# Patient Record
Sex: FEMALE | Race: OTHER | Employment: FULL TIME | URBAN - METROPOLITAN AREA
[De-identification: names, ages, dates, MRNs, and addresses within clinical notes are randomized per-mention and may not be internally consistent; named-entity substitution may affect disease eponyms.]

---

## 2018-02-21 ENCOUNTER — OFFICE VISIT (OUTPATIENT)
Dept: INTERNAL MEDICINE CLINIC | Facility: CLINIC | Age: 58
End: 2018-02-21

## 2018-02-21 VITALS
BODY MASS INDEX: 29.73 KG/M2 | HEIGHT: 64 IN | TEMPERATURE: 97 F | WEIGHT: 174.13 LBS | SYSTOLIC BLOOD PRESSURE: 105 MMHG | HEART RATE: 64 BPM | DIASTOLIC BLOOD PRESSURE: 66 MMHG | RESPIRATION RATE: 18 BRPM

## 2018-02-21 DIAGNOSIS — Z12.31 SCREENING MAMMOGRAM, ENCOUNTER FOR: ICD-10-CM

## 2018-02-21 DIAGNOSIS — Z00.00 PE (PHYSICAL EXAM), ROUTINE: Primary | ICD-10-CM

## 2018-02-21 DIAGNOSIS — H91.93 DECREASED HEARING OF BOTH EARS: ICD-10-CM

## 2018-02-21 DIAGNOSIS — Z12.11 SCREEN FOR COLON CANCER: ICD-10-CM

## 2018-02-21 DIAGNOSIS — E04.9 GOITER: ICD-10-CM

## 2018-02-21 PROCEDURE — 99396 PREV VISIT EST AGE 40-64: CPT | Performed by: INTERNAL MEDICINE

## 2018-02-21 PROCEDURE — 99212 OFFICE O/P EST SF 10 MIN: CPT | Performed by: INTERNAL MEDICINE

## 2018-02-21 NOTE — PROGRESS NOTES
HPI:    Patient ID: David Hinojosa is a 66year old female.   Patient patient presents today for physical exam, states doing well otherwise, denies chest pain, shortness of breath, dyspnea on exertion or heart palpitations also denies nausea, vomiting, diarrhe Cardiovascular: Normal rate and normal heart sounds. No murmur heard. Pulmonary/Chest: Effort normal and breath sounds normal. No respiratory distress. She has no wheezes. She has no rales. Abdominal: Soft. She exhibits no mass.  There is no hepatos

## 2018-02-24 ENCOUNTER — LAB ENCOUNTER (OUTPATIENT)
Dept: LAB | Age: 58
End: 2018-02-24
Attending: INTERNAL MEDICINE
Payer: COMMERCIAL

## 2018-02-24 DIAGNOSIS — Z00.00 PE (PHYSICAL EXAM), ROUTINE: ICD-10-CM

## 2018-02-24 LAB
ALBUMIN SERPL BCP-MCNC: 3.9 G/DL (ref 3.5–4.8)
ALBUMIN/GLOB SERPL: 1.1 {RATIO} (ref 1–2)
ALP SERPL-CCNC: 76 U/L (ref 32–100)
ALT SERPL-CCNC: 15 U/L (ref 14–54)
ANION GAP SERPL CALC-SCNC: 8 MMOL/L (ref 0–18)
AST SERPL-CCNC: 17 U/L (ref 15–41)
BACTERIA UR QL AUTO: NEGATIVE /HPF
BASOPHILS # BLD: 0 K/UL (ref 0–0.2)
BASOPHILS NFR BLD: 0 %
BILIRUB SERPL-MCNC: 0.7 MG/DL (ref 0.3–1.2)
BUN SERPL-MCNC: 16 MG/DL (ref 8–20)
BUN/CREAT SERPL: 24.2 (ref 10–20)
CALCIUM SERPL-MCNC: 9.6 MG/DL (ref 8.5–10.5)
CHLORIDE SERPL-SCNC: 105 MMOL/L (ref 95–110)
CHOLEST SERPL-MCNC: 190 MG/DL (ref 110–200)
CO2 SERPL-SCNC: 27 MMOL/L (ref 22–32)
CREAT SERPL-MCNC: 0.66 MG/DL (ref 0.5–1.5)
EOSINOPHIL # BLD: 0.1 K/UL (ref 0–0.7)
EOSINOPHIL NFR BLD: 2 %
ERYTHROCYTE [DISTWIDTH] IN BLOOD BY AUTOMATED COUNT: 13.8 % (ref 11–15)
GLOBULIN PLAS-MCNC: 3.4 G/DL (ref 2.5–3.7)
GLUCOSE SERPL-MCNC: 100 MG/DL (ref 70–99)
HCT VFR BLD AUTO: 40 % (ref 35–48)
HDLC SERPL-MCNC: 46 MG/DL
HGB BLD-MCNC: 13.2 G/DL (ref 12–16)
LDLC SERPL CALC-MCNC: 109 MG/DL (ref 0–99)
LYMPHOCYTES # BLD: 2 K/UL (ref 1–4)
LYMPHOCYTES NFR BLD: 30 %
MCH RBC QN AUTO: 27.6 PG (ref 27–32)
MCHC RBC AUTO-ENTMCNC: 32.9 G/DL (ref 32–37)
MCV RBC AUTO: 83.9 FL (ref 80–100)
MONOCYTES # BLD: 0.4 K/UL (ref 0–1)
MONOCYTES NFR BLD: 6 %
NEUTROPHILS # BLD AUTO: 4.2 K/UL (ref 1.8–7.7)
NEUTROPHILS NFR BLD: 61 %
NONHDLC SERPL-MCNC: 144 MG/DL
OSMOLALITY UR CALC.SUM OF ELEC: 291 MOSM/KG (ref 275–295)
PATIENT FASTING: YES
PLATELET # BLD AUTO: 261 K/UL (ref 140–400)
PMV BLD AUTO: 8.9 FL (ref 7.4–10.3)
POTASSIUM SERPL-SCNC: 4.2 MMOL/L (ref 3.3–5.1)
PROT SERPL-MCNC: 7.3 G/DL (ref 5.9–8.4)
RBC # BLD AUTO: 4.77 M/UL (ref 3.7–5.4)
RBC #/AREA URNS AUTO: 2 /HPF
SODIUM SERPL-SCNC: 140 MMOL/L (ref 136–144)
TRIGL SERPL-MCNC: 175 MG/DL (ref 1–149)
TSH SERPL-ACNC: 3.91 UIU/ML (ref 0.45–5.33)
WBC # BLD AUTO: 6.8 K/UL (ref 4–11)
WBC #/AREA URNS AUTO: 1 /HPF

## 2018-02-24 PROCEDURE — 80053 COMPREHEN METABOLIC PANEL: CPT

## 2018-02-24 PROCEDURE — 84443 ASSAY THYROID STIM HORMONE: CPT

## 2018-02-24 PROCEDURE — 36415 COLL VENOUS BLD VENIPUNCTURE: CPT

## 2018-02-24 PROCEDURE — 81015 MICROSCOPIC EXAM OF URINE: CPT

## 2018-02-24 PROCEDURE — 85025 COMPLETE CBC W/AUTO DIFF WBC: CPT

## 2018-02-24 PROCEDURE — 80061 LIPID PANEL: CPT

## 2018-03-04 NOTE — PROGRESS NOTES
Please call patient with blood test results. Kidney and liver function are normal, no anemia. Cholesterol is mildly elevated   sugar is borderline   Thyroid hormone is normal as well. Urine is clear.      Keep with  Low fat and  Sugar diet   , walk ,

## 2018-03-05 ENCOUNTER — HOSPITAL ENCOUNTER (OUTPATIENT)
Dept: MAMMOGRAPHY | Age: 58
Discharge: HOME | End: 2018-03-05
Attending: INTERNAL MEDICINE
Payer: COMMERCIAL

## 2018-03-05 DIAGNOSIS — Z12.31 SCREENING MAMMOGRAM, ENCOUNTER FOR: ICD-10-CM

## 2018-03-05 PROCEDURE — 77067 SCR MAMMO BI INCL CAD: CPT | Performed by: INTERNAL MEDICINE

## 2018-03-06 ENCOUNTER — OFFICE VISIT (OUTPATIENT)
Dept: AUDIOLOGY | Facility: CLINIC | Age: 58
End: 2018-03-06

## 2018-03-06 ENCOUNTER — OFFICE VISIT (OUTPATIENT)
Dept: OTOLARYNGOLOGY | Facility: CLINIC | Age: 58
End: 2018-03-06

## 2018-03-06 VITALS
DIASTOLIC BLOOD PRESSURE: 86 MMHG | BODY MASS INDEX: 29.71 KG/M2 | HEIGHT: 64 IN | WEIGHT: 174 LBS | TEMPERATURE: 99 F | SYSTOLIC BLOOD PRESSURE: 137 MMHG

## 2018-03-06 DIAGNOSIS — E01.0 THYROMEGALY: ICD-10-CM

## 2018-03-06 DIAGNOSIS — H91.93 BILATERAL HEARING LOSS, UNSPECIFIED HEARING LOSS TYPE: Primary | ICD-10-CM

## 2018-03-06 DIAGNOSIS — H90.3 SENSORINEURAL HEARING LOSS, BILATERAL: Primary | ICD-10-CM

## 2018-03-06 PROCEDURE — 99212 OFFICE O/P EST SF 10 MIN: CPT | Performed by: OTOLARYNGOLOGY

## 2018-03-06 PROCEDURE — 99243 OFF/OP CNSLTJ NEW/EST LOW 30: CPT | Performed by: OTOLARYNGOLOGY

## 2018-03-06 PROCEDURE — 92567 TYMPANOMETRY: CPT | Performed by: AUDIOLOGIST

## 2018-03-06 PROCEDURE — 92557 COMPREHENSIVE HEARING TEST: CPT | Performed by: AUDIOLOGIST

## 2018-03-06 RX ORDER — MELOXICAM 15 MG/1
15 TABLET ORAL DAILY
Qty: 30 TABLET | Refills: 3 | Status: SHIPPED | OUTPATIENT
Start: 2018-03-06 | End: 2018-05-02

## 2018-03-06 NOTE — PROGRESS NOTES
AUDIOGRAM     Fiordaliza Leblanc was referred for testing by Yomaira Berry for decreased hearing in both ears   1/31/1960  [id number]        Otoscopic inspection: right ear no cerumen; left ear no cerumen.        Tests/Procedures  Patient was tested via  stand

## 2018-03-06 NOTE — PROGRESS NOTES
Sharon Anne is a 66year old female. Patient presents with:  Hearing Loss: bilateral       HISTORY OF PRESENT ILLNESS  She presents with a history of bilateral hearing loss. She states that she has difficulty hearing her  and her children.   She al Negative Abdominal pain and diarrhea. Endocrine Negative Cold intolerance and heat intolerance. Neuro Negative Tremors. Psych Negative Anxiety and depression. Integumentary Negative Frequent skin infections, pigment change and rash.    Hema/Lymph Ne thyroid. I have recommended an ultrasound to evaluate the size and for nodularity. She appears to have TMJ related pain and discomfort to the right ear. Start Mobic warm heat soft diet and return to see me after her ultrasound.   Audiogram performed tomelissa

## 2018-03-20 ENCOUNTER — HOSPITAL ENCOUNTER (EMERGENCY)
Facility: HOSPITAL | Age: 58
Discharge: HOME | End: 2018-03-20
Attending: EMERGENCY MEDICINE
Payer: COMMERCIAL

## 2018-03-20 ENCOUNTER — APPOINTMENT (OUTPATIENT)
Dept: CT IMAGING | Facility: HOSPITAL | Age: 58
End: 2018-03-20
Attending: EMERGENCY MEDICINE
Payer: COMMERCIAL

## 2018-03-20 VITALS
TEMPERATURE: 98 F | OXYGEN SATURATION: 99 % | BODY MASS INDEX: 29.71 KG/M2 | WEIGHT: 174 LBS | RESPIRATION RATE: 18 BRPM | HEART RATE: 60 BPM | DIASTOLIC BLOOD PRESSURE: 88 MMHG | SYSTOLIC BLOOD PRESSURE: 128 MMHG | HEIGHT: 64 IN

## 2018-03-20 DIAGNOSIS — K64.9 HEMORRHOIDS, UNSPECIFIED HEMORRHOID TYPE: ICD-10-CM

## 2018-03-20 DIAGNOSIS — R10.9 ABDOMINAL PAIN OF UNKNOWN ETIOLOGY: Primary | ICD-10-CM

## 2018-03-20 DIAGNOSIS — K62.5 RECTAL BLEEDING: ICD-10-CM

## 2018-03-20 LAB
ALBUMIN SERPL BCP-MCNC: 4.1 G/DL (ref 3.5–4.8)
ALP SERPL-CCNC: 78 U/L (ref 32–100)
ALT SERPL-CCNC: 21 U/L (ref 14–54)
ANION GAP SERPL CALC-SCNC: 4 MMOL/L (ref 0–18)
AST SERPL-CCNC: 26 U/L (ref 15–41)
BASOPHILS # BLD: 0 K/UL (ref 0–0.2)
BASOPHILS NFR BLD: 1 %
BILIRUB DIRECT SERPL-MCNC: 0.2 MG/DL (ref 0–0.2)
BILIRUB SERPL-MCNC: 0.5 MG/DL (ref 0.3–1.2)
BILIRUB UR QL: NEGATIVE
BUN SERPL-MCNC: 14 MG/DL (ref 8–20)
BUN/CREAT SERPL: 18.7 (ref 10–20)
CALCIUM SERPL-MCNC: 8.8 MG/DL (ref 8.5–10.5)
CHLORIDE SERPL-SCNC: 104 MMOL/L (ref 95–110)
CLARITY UR: CLEAR
CO2 SERPL-SCNC: 27 MMOL/L (ref 22–32)
COLOR UR: YELLOW
CREAT SERPL-MCNC: 0.75 MG/DL (ref 0.5–1.5)
EOSINOPHIL # BLD: 0.2 K/UL (ref 0–0.7)
EOSINOPHIL NFR BLD: 2 %
ERYTHROCYTE [DISTWIDTH] IN BLOOD BY AUTOMATED COUNT: 14.1 % (ref 11–15)
GLUCOSE SERPL-MCNC: 105 MG/DL (ref 70–99)
GLUCOSE UR-MCNC: NEGATIVE MG/DL
HCT VFR BLD AUTO: 39 % (ref 35–48)
HGB BLD-MCNC: 13 G/DL (ref 12–16)
KETONES UR-MCNC: NEGATIVE MG/DL
LEUKOCYTE ESTERASE UR QL STRIP.AUTO: NEGATIVE
LIPASE SERPL-CCNC: 23 U/L (ref 22–51)
LYMPHOCYTES # BLD: 3 K/UL (ref 1–4)
LYMPHOCYTES NFR BLD: 37 %
MCH RBC QN AUTO: 28.3 PG (ref 27–32)
MCHC RBC AUTO-ENTMCNC: 33.4 G/DL (ref 32–37)
MCV RBC AUTO: 84.7 FL (ref 80–100)
MONOCYTES # BLD: 0.5 K/UL (ref 0–1)
MONOCYTES NFR BLD: 6 %
NEUTROPHILS # BLD AUTO: 4.4 K/UL (ref 1.8–7.7)
NEUTROPHILS NFR BLD: 55 %
NITRITE UR QL STRIP.AUTO: NEGATIVE
OSMOLALITY UR CALC.SUM OF ELEC: 281 MOSM/KG (ref 275–295)
PH UR: 5 [PH] (ref 5–8)
PLATELET # BLD AUTO: 295 K/UL (ref 140–400)
PMV BLD AUTO: 9.4 FL (ref 7.4–10.3)
POTASSIUM SERPL-SCNC: 3.8 MMOL/L (ref 3.3–5.1)
PROT SERPL-MCNC: 7.6 G/DL (ref 5.9–8.4)
PROT UR-MCNC: NEGATIVE MG/DL
RBC # BLD AUTO: 4.61 M/UL (ref 3.7–5.4)
RBC #/AREA URNS AUTO: 1 /HPF
SODIUM SERPL-SCNC: 135 MMOL/L (ref 136–144)
SP GR UR STRIP: 1.02 (ref 1–1.03)
UROBILINOGEN UR STRIP-ACNC: <2
VIT C UR-MCNC: NEGATIVE MG/DL
WBC # BLD AUTO: 8.2 K/UL (ref 4–11)
WBC #/AREA URNS AUTO: 2 /HPF

## 2018-03-20 PROCEDURE — 83690 ASSAY OF LIPASE: CPT | Performed by: EMERGENCY MEDICINE

## 2018-03-20 PROCEDURE — 80048 BASIC METABOLIC PNL TOTAL CA: CPT | Performed by: EMERGENCY MEDICINE

## 2018-03-20 PROCEDURE — 93010 ELECTROCARDIOGRAM REPORT: CPT | Performed by: EMERGENCY MEDICINE

## 2018-03-20 PROCEDURE — 93005 ELECTROCARDIOGRAM TRACING: CPT

## 2018-03-20 PROCEDURE — 74177 CT ABD & PELVIS W/CONTRAST: CPT | Performed by: EMERGENCY MEDICINE

## 2018-03-20 PROCEDURE — 96374 THER/PROPH/DIAG INJ IV PUSH: CPT

## 2018-03-20 PROCEDURE — 80076 HEPATIC FUNCTION PANEL: CPT | Performed by: EMERGENCY MEDICINE

## 2018-03-20 PROCEDURE — 99285 EMERGENCY DEPT VISIT HI MDM: CPT

## 2018-03-20 PROCEDURE — 81001 URINALYSIS AUTO W/SCOPE: CPT | Performed by: EMERGENCY MEDICINE

## 2018-03-20 PROCEDURE — 85025 COMPLETE CBC W/AUTO DIFF WBC: CPT | Performed by: EMERGENCY MEDICINE

## 2018-03-20 RX ORDER — ONDANSETRON 2 MG/ML
4 INJECTION INTRAMUSCULAR; INTRAVENOUS ONCE
Status: COMPLETED | OUTPATIENT
Start: 2018-03-20 | End: 2018-03-20

## 2018-03-20 NOTE — ED PROVIDER NOTES
Patient Seen in: [facility] AND [facility] Emergency Department    History   Patient presents with:  Abdomen/Flank Pain (GI/)  GI Bleeding (gastrointestinal)    Stated Complaint: worsening abdominal x3 days; noticed blood in stool    HPI    65 yo female with s distal pulses. Pulmonary/Chest: Effort normal and breath sounds normal.   Abdominal: Soft. Bowel sounds are normal. She exhibits no distension and no mass. There is tenderness (epigastric and left lower quadrant). There is no rebound and no guarding. 0407  ------------------------------------------------------------       [facility]     Labs reviewed and are essentially unremarkable. CT read by vision. No diverticulitis or colitis. Normal appendix.                Disposition and Plan     Clinical Impression:

## 2018-03-21 NOTE — H&P
[address] [facility] [address] Gastroenterology                                                                                                  Clinic History and Physical     Pa sleep apnea. Pertinent Family Hx:  - No family hx of esophageal, gastric or colon cancer  - No family history of IBD.     Prior endoscopies:  Denies    Social Hx:  - No smoking/etoh  - Denies illicit drug use   - LMP: Hysterectomy  - Lives with spouse  - fever  ENDOCRINE:  negative for cold intolerance and heat intolerance  MUSCULOSKELETAL:  negative for joint effusion/severe erythema  BEHAVIOR/PSYCH:  negative for psychotic behavior      PHYSICAL EXAM:   Blood pressure 136/88, pulse 66, height 5' 4.5\" (1 female provider. 2.  Epigastric pain/heartburn: Epigastric tenderness was noted on physical exam.  No past history of H. pylori or GERD. No previous OTC or prescription measures. Symptoms likely due to acid reflux.   Would recommend H. pylori testing t

## 2018-03-22 ENCOUNTER — OFFICE VISIT (OUTPATIENT)
Dept: GASTROENTEROLOGY | Facility: CLINIC | Age: 58
End: 2018-03-22

## 2018-03-22 ENCOUNTER — TELEPHONE (OUTPATIENT)
Dept: GASTROENTEROLOGY | Facility: CLINIC | Age: 58
End: 2018-03-22

## 2018-03-22 VITALS
DIASTOLIC BLOOD PRESSURE: 88 MMHG | SYSTOLIC BLOOD PRESSURE: 136 MMHG | HEIGHT: 64.5 IN | BODY MASS INDEX: 29.35 KG/M2 | WEIGHT: 174 LBS | HEART RATE: 66 BPM

## 2018-03-22 DIAGNOSIS — R12 HEARTBURN: ICD-10-CM

## 2018-03-22 DIAGNOSIS — K92.1 BLOOD IN STOOL: Primary | ICD-10-CM

## 2018-03-22 DIAGNOSIS — R10.13 EPIGASTRIC PAIN: ICD-10-CM

## 2018-03-22 PROCEDURE — 99203 OFFICE O/P NEW LOW 30 MIN: CPT | Performed by: NURSE PRACTITIONER

## 2018-03-22 PROCEDURE — 99212 OFFICE O/P EST SF 10 MIN: CPT | Performed by: NURSE PRACTITIONER

## 2018-03-22 RX ORDER — PANTOPRAZOLE SODIUM 20 MG/1
20 TABLET, DELAYED RELEASE ORAL
Qty: 30 TABLET | Refills: 5 | Status: SHIPPED | OUTPATIENT
Start: 2018-03-22 | End: 2018-04-21

## 2018-03-22 RX ORDER — ACETAMINOPHEN 325 MG/1
325 TABLET ORAL EVERY 6 HOURS PRN
COMMUNITY

## 2018-03-22 NOTE — TELEPHONE ENCOUNTER
Scheduled for:  Colonoscopy 16518  Provider Name: Dr. Natalie Casanova  Date:  4/13/18  Location:  [facility]  Sedation:  IV  Time:  1100 (pt is aware to arrive at 1000)   Prep:  Colyte  Meds/Allergies Reconciled?:  Reyna/ELMER reviewed   Diagnosis with codes:  Blood in stool

## 2018-03-22 NOTE — PATIENT INSTRUCTIONS
1. Schedule colonoscopy with Dr. Peters Setting w/ IV Twilight    2.  bowel prep from pharmacy - split dose Colyte    3. Continue all medications for procedure     4. Read all bowel prep instructions carefully    5.  AVOID seeds, nuts, popcorn, raw fruits and

## 2018-03-23 NOTE — H&P
Gastroenterology attending:    I have reviewed the medical record and the thorough consultation of ELMER Randall.   This is a 57-year-old female who is due for colorectal screening but also has blood in stool and no prior history of endoscopic evaluat

## 2018-03-24 ENCOUNTER — HOSPITAL ENCOUNTER (OUTPATIENT)
Dept: ULTRASOUND IMAGING | Age: 58
Discharge: HOME | End: 2018-03-24
Attending: OTOLARYNGOLOGY
Payer: COMMERCIAL

## 2018-03-24 DIAGNOSIS — E01.0 THYROMEGALY: ICD-10-CM

## 2018-03-24 PROCEDURE — 76536 US EXAM OF HEAD AND NECK: CPT | Performed by: OTOLARYNGOLOGY

## 2018-03-26 ENCOUNTER — APPOINTMENT (OUTPATIENT)
Dept: LAB | Age: 58
End: 2018-03-26
Attending: NURSE PRACTITIONER
Payer: COMMERCIAL

## 2018-03-26 DIAGNOSIS — R10.13 EPIGASTRIC PAIN: ICD-10-CM

## 2018-03-26 PROCEDURE — 87338 HPYLORI STOOL AG IA: CPT

## 2018-03-28 LAB — HELICOBACTER PYLORI AG, FECAL: POSITIVE

## 2018-03-30 ENCOUNTER — TELEPHONE (OUTPATIENT)
Dept: GASTROENTEROLOGY | Facility: CLINIC | Age: 58
End: 2018-03-30

## 2018-03-30 NOTE — TELEPHONE ENCOUNTER
----- Message from ELMER Caldwell sent at 3/28/2018  4:35 PM CDT -----  Nursing: Please let the patient know her H. pylori test came back positive. This infection in the stomach that requires antibiotic treatment.   I have sent to antibiotics and 1 ac

## 2018-03-30 NOTE — TELEPHONE ENCOUNTER
Spoke to pt, relayed Yael PAYNE's message as shown below. Pt educated on the route, dose, and frequency of medications. Pt verbalized understanding of whole message and had no further questions at this time.

## 2018-04-10 ENCOUNTER — TELEPHONE (OUTPATIENT)
Dept: GASTROENTEROLOGY | Facility: CLINIC | Age: 58
End: 2018-04-10

## 2018-04-10 DIAGNOSIS — K92.1 BLOOD IN STOOL: Primary | ICD-10-CM

## 2018-04-10 NOTE — TELEPHONE ENCOUNTER
Rescheduled for: Colonoscopy 90101    Provider Name: Dr. Brionna Camacho  Date:    From-4/13/18  To-6/29/18  Location:  [facility]  Sedation:  IV  Time:    From-1100  To-1300 (pt is aware to arrive at 1200)   Prep:  Enrike, mailed new instructions on 4/10/18  Meds/Allergies

## 2018-05-02 ENCOUNTER — OFFICE VISIT (OUTPATIENT)
Dept: INTERNAL MEDICINE CLINIC | Facility: CLINIC | Age: 58
End: 2018-05-02

## 2018-05-02 ENCOUNTER — NURSE TRIAGE (OUTPATIENT)
Dept: OTHER | Age: 58
End: 2018-05-02

## 2018-05-02 VITALS
OXYGEN SATURATION: 98 % | BODY MASS INDEX: 30.02 KG/M2 | TEMPERATURE: 100 F | HEIGHT: 64.5 IN | DIASTOLIC BLOOD PRESSURE: 82 MMHG | WEIGHT: 178 LBS | HEART RATE: 92 BPM | SYSTOLIC BLOOD PRESSURE: 130 MMHG

## 2018-05-02 DIAGNOSIS — J20.2 ACUTE BRONCHITIS DUE TO STREPTOCOCCUS: Primary | ICD-10-CM

## 2018-05-02 PROCEDURE — 99213 OFFICE O/P EST LOW 20 MIN: CPT | Performed by: INTERNAL MEDICINE

## 2018-05-02 PROCEDURE — 99212 OFFICE O/P EST SF 10 MIN: CPT | Performed by: INTERNAL MEDICINE

## 2018-05-02 RX ORDER — ALBUTEROL SULFATE 90 UG/1
2 AEROSOL, METERED RESPIRATORY (INHALATION) EVERY 6 HOURS PRN
Qty: 1 INHALER | Refills: 6 | Status: SHIPPED | OUTPATIENT
Start: 2018-05-02 | End: 2018-05-09

## 2018-05-02 RX ORDER — AZITHROMYCIN 250 MG/1
250 TABLET, FILM COATED ORAL DAILY
Qty: 6 TABLET | Refills: 0 | Status: SHIPPED | OUTPATIENT
Start: 2018-05-02 | End: 2018-05-09

## 2018-05-02 RX ORDER — BENZONATATE 100 MG/1
100 CAPSULE ORAL 2 TIMES DAILY PRN
Qty: 10 CAPSULE | Refills: 0 | Status: SHIPPED | OUTPATIENT
Start: 2018-05-02 | End: 2018-05-09

## 2018-05-02 NOTE — PROGRESS NOTES
Tegan Hutton is a 66year old female.   Patient presents with:  URI: cough, headache, aches and chest tightness started 3 days ago      HPI:   Pt is a 65 yo female comes as an urgent visit   c/c sob x 3 days   c/o HA and coughing   Ros as below   Headache joint pain, muscle pain  NEURO: + headaches  ,no  Anxiety or  depression    EXAM:   /82 (BP Location: Left arm, Patient Position: Sitting, Cuff Size: adult)   Pulse 92   Temp 99.7 °F (37.6 °C) (Oral)   Ht 5' 4.5\" (1.638 m)   Wt 178 lb (80.7 kg)   Sp

## 2018-05-02 NOTE — TELEPHONE ENCOUNTER
Action Requested: Summary for Provider     []  Critical Lab, Recommendations Needed  [] Need Additional Advice  []   FYI    []   Need Orders  [] Need Medications Sent to Pharmacy  []  Other     SUMMARY: appt scheduled today with Dr. Fawad Juan for cough, feve

## 2018-05-09 ENCOUNTER — OFFICE VISIT (OUTPATIENT)
Dept: INTERNAL MEDICINE CLINIC | Facility: CLINIC | Age: 58
End: 2018-05-09

## 2018-05-09 VITALS
DIASTOLIC BLOOD PRESSURE: 78 MMHG | WEIGHT: 178 LBS | HEART RATE: 62 BPM | OXYGEN SATURATION: 98 % | BODY MASS INDEX: 30.02 KG/M2 | TEMPERATURE: 98 F | SYSTOLIC BLOOD PRESSURE: 144 MMHG | HEIGHT: 64.5 IN

## 2018-05-09 DIAGNOSIS — J45.21 MILD INTERMITTENT ASTHMA WITH EXACERBATION: Primary | ICD-10-CM

## 2018-05-09 PROCEDURE — 99212 OFFICE O/P EST SF 10 MIN: CPT | Performed by: INTERNAL MEDICINE

## 2018-05-09 PROCEDURE — 99214 OFFICE O/P EST MOD 30 MIN: CPT | Performed by: INTERNAL MEDICINE

## 2018-05-09 RX ORDER — PREDNISONE 10 MG/1
TABLET ORAL
Qty: 20 TABLET | Refills: 0 | Status: SHIPPED | OUTPATIENT
Start: 2018-05-09 | End: 2018-10-03 | Stop reason: ALTCHOICE

## 2018-05-09 RX ORDER — GUAIFENESIN AND CODEINE PHOSPHATE 100; 10 MG/5ML; MG/5ML
5 SOLUTION ORAL 2 TIMES DAILY
Qty: 118 ML | Refills: 0 | Status: SHIPPED | OUTPATIENT
Start: 2018-05-09 | End: 2018-05-23

## 2018-05-09 NOTE — PROGRESS NOTES
Norberto Schwartz is a 66year old female.   Patient presents with:  Bronchitis: follow up      HPI:   She comes for follow-up for her bronchitis  c/c cough and fevers  c/o she is a little bit better but still with congestion in the chest . states that her shor wheezing  MUS: No back pain, joint pain, muscle pain  NEURO: + headaches  ,no  Anxiety or  depression    EXAM:   /78 (BP Location: Right arm, Patient Position: Sitting, Cuff Size: adult)   Pulse 62   Temp 98 °F (36.7 °C) (Oral)   Ht 5' 4.5\" (1.638 m

## 2018-05-09 NOTE — PATIENT INSTRUCTIONS
Asthma Trigger Checklist  Allergens, irritants, and other things may trigger your asthma. Check the box next to each of your triggers. After each trigger is a list of ways to avoid it.     Dust mites.  Dust mites live in mattresses, bedding, carpets, emanuel · Remove garbage from your home daily. · Store food in tightly sealed containers. Wash dishes as soon as they are used. · Use bait stations or traps to control roaches. Avoid using chemical sprays.      Smoke.  Smoke may be from cigarettes, cigars, pipes, · Watch for very high or low temperatures, very humid conditions, or a lot of wind, as these conditions can make symptoms worse. · Limit outdoor activity during the type of weather that affects you.   · Wear a scarf over your mouth and nose in cold weather · Stop if you have any symptoms. Make sure you talk with your provider about these symptoms. Date Last Reviewed: 1/1/2017  © 4885-3163 [employer]. [address] [facility], [address] [place] [address]. All rights reserved.  This information is not inten

## 2018-05-31 ENCOUNTER — OFFICE VISIT (OUTPATIENT)
Dept: AUDIOLOGY | Facility: CLINIC | Age: 58
End: 2018-05-31

## 2018-05-31 ENCOUNTER — OFFICE VISIT (OUTPATIENT)
Dept: OTOLARYNGOLOGY | Facility: CLINIC | Age: 58
End: 2018-05-31

## 2018-05-31 VITALS
DIASTOLIC BLOOD PRESSURE: 81 MMHG | BODY MASS INDEX: 30.05 KG/M2 | SYSTOLIC BLOOD PRESSURE: 123 MMHG | TEMPERATURE: 98 F | HEIGHT: 64 IN | WEIGHT: 176 LBS

## 2018-05-31 DIAGNOSIS — R42 DIZZINESS: Primary | ICD-10-CM

## 2018-05-31 DIAGNOSIS — R43.9 SMELL AND TASTE DISORDER: ICD-10-CM

## 2018-05-31 PROCEDURE — 92567 TYMPANOMETRY: CPT | Performed by: AUDIOLOGIST

## 2018-05-31 PROCEDURE — 99212 OFFICE O/P EST SF 10 MIN: CPT | Performed by: OTOLARYNGOLOGY

## 2018-05-31 PROCEDURE — 99214 OFFICE O/P EST MOD 30 MIN: CPT | Performed by: OTOLARYNGOLOGY

## 2018-05-31 PROCEDURE — 92557 COMPREHENSIVE HEARING TEST: CPT | Performed by: AUDIOLOGIST

## 2018-05-31 RX ORDER — VALACYCLOVIR HYDROCHLORIDE 500 MG/1
500 TABLET, FILM COATED ORAL EVERY 8 HOURS
Qty: 21 TABLET | Refills: 0 | Status: SHIPPED | OUTPATIENT
Start: 2018-05-31 | End: 2018-06-07

## 2018-05-31 RX ORDER — PREDNISONE 10 MG/1
TABLET ORAL
Qty: 44 TABLET | Refills: 0 | Status: SHIPPED | OUTPATIENT
Start: 2018-05-31 | End: 2018-10-03 | Stop reason: ALTCHOICE

## 2018-05-31 NOTE — PROGRESS NOTES
Chad Jesus is a 66year old female.   Patient presents with:  Ear Problem: pt reports wakes up dizzy. ears feel full, some right ear pain  Mouth/Lip Problem: pt reports no sense of smell and taste for 3 wks      HISTORY OF PRESENT ILLNESS  She presents w and syncope. GI Negative Abdominal pain and diarrhea. Endocrine Negative Cold intolerance and heat intolerance. Neuro Negative Tremors. Psych Negative Anxiety and depression.    Integumentary Negative Frequent skin infections, pigment change and michelle daily day one through 5, 4 tablets daily on days  6 and 7, 2 tablets daily on days 8 and 9, One tablet daily on days 10 and 11., Disp: 44 tablet, Rfl: 0  •  acetaminophen 325 MG Oral Tab, Take 325 mg by mouth every 6 (six) hours as needed for Pain., Disp:

## 2018-06-04 NOTE — PROGRESS NOTES
AUDIOGRAM     Fiordaliza Leblanc was referred for testing by Sam Streeter for dizziness  1/31/1960  [id number]        Otoscopic inspection: right ear no cerumen; left ear no cerumen.        Tests/Procedures  Patient was tested via  standard insert earphones a

## 2018-06-27 ENCOUNTER — TELEPHONE (OUTPATIENT)
Dept: GASTROENTEROLOGY | Facility: CLINIC | Age: 58
End: 2018-06-27

## 2018-06-27 DIAGNOSIS — K92.1 BLOOD IN STOOL: Primary | ICD-10-CM

## 2018-06-28 NOTE — TELEPHONE ENCOUNTER
Called pt left message notifying pt we received her two phone calls to cancel her CLN for 6/29/18 w/ Dr Fanny Rosado at 1:00 PM at [address] [place] [address] with IV sedation. Cancelled procedure in EPIC,sent surgical case change request, forwarded to GI schedulers.     Dr. Fanny Rosado- [facility]

## 2018-07-17 ENCOUNTER — TELEPHONE (OUTPATIENT)
Dept: GASTROENTEROLOGY | Facility: CLINIC | Age: 58
End: 2018-07-17

## 2018-07-17 NOTE — TELEPHONE ENCOUNTER
Dr. Juan David CEJA    After speaking with Sandra Heath, it looks like the date for this pt's procedure was entered in wrong. This pt is NOT scheduled for 7/18/18 @ [facility]. The correct scheduling date for this pt is 8/10/18 @ [facility].  Sandra Heath will reach out to Halima to c

## 2018-07-17 NOTE — TELEPHONE ENCOUNTER
Future Appointments  Date Time Provider Danay Molina   8/10/2018 1:30 PM SACK, PROCEDURE [id number] None     No further action required by GI Clinical Staff

## 2018-07-17 NOTE — TELEPHONE ENCOUNTER
The GI  endoscopy staff noted that this patient is on the schedule for tomorrow, July 18 for colonoscopy at the hospital, however I will be in the office at  [facility] office building.      In addition, it appears that she will be on for colonoscopy in

## 2018-08-07 ENCOUNTER — TELEPHONE (OUTPATIENT)
Dept: GASTROENTEROLOGY | Facility: CLINIC | Age: 58
End: 2018-08-07

## 2018-08-07 NOTE — TELEPHONE ENCOUNTER
Tried calling the Pt and there is no answer not able to leave Messages and I  Mailed letter to patient notifying her  of overdue labs (H-Pylori Stool Ag,Eia ) Ordered 3/28/18  and due 7/28/18. Overdue letter mailed to patient.

## 2018-08-10 ENCOUNTER — SURGERY (OUTPATIENT)
Age: 58
End: 2018-08-10

## 2018-08-10 ENCOUNTER — HOSPITAL ENCOUNTER (OUTPATIENT)
Facility: HOSPITAL | Age: 58
Setting detail: HOSPITAL OUTPATIENT SURGERY
Discharge: HOME | End: 2018-08-10
Attending: INTERNAL MEDICINE | Admitting: INTERNAL MEDICINE
Payer: COMMERCIAL

## 2018-08-10 VITALS
SYSTOLIC BLOOD PRESSURE: 104 MMHG | RESPIRATION RATE: 17 BRPM | HEART RATE: 58 BPM | BODY MASS INDEX: 34.27 KG/M2 | DIASTOLIC BLOOD PRESSURE: 85 MMHG | OXYGEN SATURATION: 97 % | WEIGHT: 170 LBS | HEIGHT: 59.06 IN

## 2018-08-10 DIAGNOSIS — K92.1 BLOOD IN STOOL: ICD-10-CM

## 2018-08-10 PROBLEM — K64.8 INTERNAL HEMORRHOIDS WITHOUT COMPLICATION: Status: ACTIVE | Noted: 2018-08-10

## 2018-08-10 PROCEDURE — 45378 DIAGNOSTIC COLONOSCOPY: CPT | Performed by: INTERNAL MEDICINE

## 2018-08-10 PROCEDURE — G0500 MOD SEDAT ENDO SERVICE >5YRS: HCPCS | Performed by: INTERNAL MEDICINE

## 2018-08-10 PROCEDURE — 0DJD8ZZ INSPECTION OF LOWER INTESTINAL TRACT, VIA NATURAL OR ARTIFICIAL OPENING ENDOSCOPIC: ICD-10-PCS | Performed by: INTERNAL MEDICINE

## 2018-08-10 RX ORDER — SODIUM CHLORIDE 0.9 % (FLUSH) 0.9 %
10 SYRINGE (ML) INJECTION AS NEEDED
Status: DISCONTINUED | OUTPATIENT
Start: 2018-08-10 | End: 2018-08-10

## 2018-08-10 RX ORDER — ONDANSETRON 2 MG/ML
4 INJECTION INTRAMUSCULAR; INTRAVENOUS ONCE
Status: COMPLETED | OUTPATIENT
Start: 2018-08-10 | End: 2018-08-10

## 2018-08-10 RX ORDER — SODIUM CHLORIDE, SODIUM LACTATE, POTASSIUM CHLORIDE, CALCIUM CHLORIDE 600; 310; 30; 20 MG/100ML; MG/100ML; MG/100ML; MG/100ML
INJECTION, SOLUTION INTRAVENOUS CONTINUOUS
Status: DISCONTINUED | OUTPATIENT
Start: 2018-08-10 | End: 2018-08-10

## 2018-08-10 RX ORDER — MIDAZOLAM HYDROCHLORIDE 1 MG/ML
INJECTION INTRAMUSCULAR; INTRAVENOUS
Status: DISCONTINUED | OUTPATIENT
Start: 2018-08-10 | End: 2018-08-10

## 2018-08-10 RX ORDER — MIDAZOLAM HYDROCHLORIDE 1 MG/ML
1 INJECTION INTRAMUSCULAR; INTRAVENOUS EVERY 5 MIN PRN
Status: DISCONTINUED | OUTPATIENT
Start: 2018-08-10 | End: 2018-08-10

## 2018-08-10 NOTE — BRIEF OP NOTE
Pre-Operative Diagnosis: Colorectal screening, blood in stool      Post-Operative Diagnosis: Normal colonoscopy to the terminal ileum, small internal hemorrhoids     Procedure Performed:   Procedure(s):  COLONOSCOPY    Surgeon(s) and Role:     * Gilmer Boateng

## 2018-08-10 NOTE — OPERATIVE REPORT
[facility]    PATIENT'S NAME: RILEY OZUNA   ATTENDING PHYSICIAN: Grace Wen MD   OPERATING PHYSICIAN: Grace Wen MD   PATIENT ACCOUNT#:   [id number]    LOCATION:  [facility] ROOM 2 [address] tolerated the procedure well without immediate complication. IMPRESSION:  Small internal hemorrhoids, otherwise normal colonoscopy to the terminal ileum. RECOMMENDATIONS:    1.    Stool softeners and hemorrhoidal suppositories p.r.n.  2.   Next colo

## 2018-08-10 NOTE — H&P
History & Physical Examination    Patient Name: Juju Villegas  MRN: [id number]  CSN: [id number]  YOB: 1960    Diagnosis: Colorectal screening, blood in stool      Prescriptions Prior to Admission:  acetaminophen 325 MG Oral Tab Take 325 mg by within the last 30 days. Any changes noted above.     31 Shaina Ribeiro - Gastroenterology  8/10/2018  12:43 PM

## 2018-08-16 ENCOUNTER — TELEPHONE (OUTPATIENT)
Dept: GASTROENTEROLOGY | Facility: CLINIC | Age: 58
End: 2018-08-16

## 2018-09-07 ENCOUNTER — TELEPHONE (OUTPATIENT)
Dept: GASTROENTEROLOGY | Facility: CLINIC | Age: 58
End: 2018-09-07

## 2018-09-07 NOTE — TELEPHONE ENCOUNTER
Left voicemail and mailed letter to patient notifying her of overdue labs (H-pylori stool Ag,Eia) Ordered  3/28/18 and due 8/28/18. Overdue letter mailed to patient.

## 2018-09-12 ENCOUNTER — OFFICE VISIT (OUTPATIENT)
Dept: INTERNAL MEDICINE CLINIC | Facility: CLINIC | Age: 58
End: 2018-09-12
Payer: COMMERCIAL

## 2018-09-12 ENCOUNTER — TELEPHONE (OUTPATIENT)
Dept: INTERNAL MEDICINE CLINIC | Facility: CLINIC | Age: 58
End: 2018-09-12

## 2018-09-12 VITALS
RESPIRATION RATE: 18 BRPM | WEIGHT: 178.5 LBS | SYSTOLIC BLOOD PRESSURE: 104 MMHG | HEIGHT: 64 IN | DIASTOLIC BLOOD PRESSURE: 72 MMHG | TEMPERATURE: 96 F | HEART RATE: 73 BPM | BODY MASS INDEX: 30.48 KG/M2

## 2018-09-12 DIAGNOSIS — M79.673 PAIN OF FOOT, UNSPECIFIED LATERALITY: Primary | ICD-10-CM

## 2018-09-12 DIAGNOSIS — R42 VERTIGO: ICD-10-CM

## 2018-09-12 DIAGNOSIS — M79.672 FOOT PAIN, BILATERAL: ICD-10-CM

## 2018-09-12 DIAGNOSIS — K21.9 GASTROESOPHAGEAL REFLUX DISEASE WITHOUT ESOPHAGITIS: ICD-10-CM

## 2018-09-12 DIAGNOSIS — J30.89 ALLERGIC RHINITIS DUE TO OTHER ALLERGIC TRIGGER, UNSPECIFIED SEASONALITY: ICD-10-CM

## 2018-09-12 DIAGNOSIS — M79.671 FOOT PAIN, BILATERAL: ICD-10-CM

## 2018-09-12 PROCEDURE — 99212 OFFICE O/P EST SF 10 MIN: CPT | Performed by: INTERNAL MEDICINE

## 2018-09-12 PROCEDURE — 99214 OFFICE O/P EST MOD 30 MIN: CPT | Performed by: INTERNAL MEDICINE

## 2018-09-12 RX ORDER — FLUTICASONE PROPIONATE 50 MCG
2 SPRAY, SUSPENSION (ML) NASAL DAILY
Qty: 1 BOTTLE | Refills: 0 | Status: SHIPPED | OUTPATIENT
Start: 2018-09-12 | End: 2018-09-12

## 2018-09-12 RX ORDER — MOMETASONE 50 UG/1
2 SPRAY, METERED NASAL DAILY
Qty: 1 BOTTLE | Refills: 0 | Status: SHIPPED | OUTPATIENT
Start: 2018-09-12 | End: 2018-12-13

## 2018-09-12 RX ORDER — OMEPRAZOLE 40 MG/1
40 CAPSULE, DELAYED RELEASE ORAL DAILY
Qty: 30 CAPSULE | Refills: 1 | Status: SHIPPED | OUTPATIENT
Start: 2018-09-12 | End: 2018-10-03

## 2018-09-12 RX ORDER — FEXOFENADINE HCL 180 MG/1
180 TABLET ORAL DAILY
Qty: 30 TABLET | Refills: 2 | Status: SHIPPED | OUTPATIENT
Start: 2018-09-12 | End: 2018-12-13

## 2018-09-12 NOTE — TELEPHONE ENCOUNTER
Fluticasone is not covered by plan, preferred alternative is mometasonesprmcg, flunisolidespr, triamcinolonaermcgac.  Please call [facility] [place] to change meds

## 2018-09-12 NOTE — PROGRESS NOTES
HPI:    Patient ID: Nayana Barr is a 66year old female.   Patient in office today for  follow up visit and exam. States feeling well but has  Still some  Taste and  Smell  Problems -.has some  Acid  In  Throat    Frequently  Denies chest pain, shortnesss 2 sprays by Each Nare route daily. Apply two puffs in each nostril once daily for 4   weeks then as needed Disp: 1 Bottle Rfl: 0   Omeprazole 40 MG Oral Capsule Delayed Release Take 1 capsule (40 mg total) by mouth daily.  Take 30-60 minutes before breakfas exhibits no edema. Lumbar back: She exhibits no tenderness, no bony tenderness, no swelling and no spasm. Lymphadenopathy:     She has no cervical adenopathy. Neurological: She is alert and oriented to person, place, and time.  She has normal str

## 2018-09-12 NOTE — TELEPHONE ENCOUNTER
Spoke with pt to clarify alternative meds for Flonase. Alternatives are Flunisolide and Triamcinolone    Please advise.

## 2018-10-03 ENCOUNTER — OFFICE VISIT (OUTPATIENT)
Dept: INTERNAL MEDICINE CLINIC | Facility: CLINIC | Age: 58
End: 2018-10-03
Payer: COMMERCIAL

## 2018-10-03 VITALS
WEIGHT: 176.13 LBS | DIASTOLIC BLOOD PRESSURE: 67 MMHG | RESPIRATION RATE: 18 BRPM | HEART RATE: 69 BPM | SYSTOLIC BLOOD PRESSURE: 105 MMHG | TEMPERATURE: 98 F | HEIGHT: 64 IN | BODY MASS INDEX: 30.07 KG/M2

## 2018-10-03 DIAGNOSIS — R43.9 PROBLEMS WITH SMELL AND TASTE: Primary | ICD-10-CM

## 2018-10-03 DIAGNOSIS — K21.9 GASTROESOPHAGEAL REFLUX DISEASE WITHOUT ESOPHAGITIS: ICD-10-CM

## 2018-10-03 PROCEDURE — 99214 OFFICE O/P EST MOD 30 MIN: CPT | Performed by: INTERNAL MEDICINE

## 2018-10-03 PROCEDURE — 99212 OFFICE O/P EST SF 10 MIN: CPT | Performed by: INTERNAL MEDICINE

## 2018-10-03 RX ORDER — OMEPRAZOLE 40 MG/1
40 CAPSULE, DELAYED RELEASE ORAL DAILY
Qty: 60 CAPSULE | Refills: 1 | Status: SHIPPED | OUTPATIENT
Start: 2018-10-03 | End: 2019-04-30

## 2018-10-03 NOTE — PROGRESS NOTES
HPI:    Patient ID: Danilo Hopson is a 66year old female.   Patient in office today for difficulty with smell and taste patient states she is using Flonase and Claritin and is seeing some improvement but not a lot patient states she feels possibly 25-30% b Rfl:      Allergies:No Known Allergies   PHYSICAL EXAM:   Physical Exam   Constitutional: She is oriented to person, place, and time. She appears well-developed and well-nourished. No distress. HENT:   Head: Normocephalic and atraumatic.    Right Ear: Tym Refer  To see   Dentist  And ENT - f/u visit   Neuro  Exam  Normal    Patient complains of periaortic headaches and some dizziness with that happens rarely goes away by itself or Tylenol does not have that daily or weekly patient refusing states that t

## 2018-10-04 ENCOUNTER — OFFICE VISIT (OUTPATIENT)
Dept: PODIATRY CLINIC | Facility: CLINIC | Age: 58
End: 2018-10-04
Payer: COMMERCIAL

## 2018-10-04 DIAGNOSIS — M72.2 PLANTAR FASCIITIS, BILATERAL: Primary | ICD-10-CM

## 2018-10-04 PROCEDURE — 99212 OFFICE O/P EST SF 10 MIN: CPT | Performed by: PODIATRIST

## 2018-10-04 PROCEDURE — 99243 OFF/OP CNSLTJ NEW/EST LOW 30: CPT | Performed by: PODIATRIST

## 2018-10-04 NOTE — PROGRESS NOTES
HPI:    Patient ID: Adrien Fragoso is a 66year old female. HPI  This 44-year-old female presents to me as a new patient on consult from [address] He is accompanied today by her  and the chief complaint is pain associated with both heels. meaning no barefoot walking. She will also take Aleve 1 tablet a.m. and p.m. with meals. I reviewed the use of the medication, expectations and concerns. She was instructed to ice twice every evening for 15 minutes.   I do not presently see reason for x-

## 2018-12-13 ENCOUNTER — OFFICE VISIT (OUTPATIENT)
Dept: INTERNAL MEDICINE CLINIC | Facility: CLINIC | Age: 58
End: 2018-12-13
Payer: COMMERCIAL

## 2018-12-13 ENCOUNTER — OFFICE VISIT (OUTPATIENT)
Dept: PODIATRY CLINIC | Facility: CLINIC | Age: 58
End: 2018-12-13
Payer: COMMERCIAL

## 2018-12-13 ENCOUNTER — TELEPHONE (OUTPATIENT)
Dept: INTERNAL MEDICINE CLINIC | Facility: CLINIC | Age: 58
End: 2018-12-13

## 2018-12-13 VITALS
HEART RATE: 67 BPM | WEIGHT: 173.38 LBS | RESPIRATION RATE: 20 BRPM | BODY MASS INDEX: 29.6 KG/M2 | SYSTOLIC BLOOD PRESSURE: 109 MMHG | HEIGHT: 64 IN | DIASTOLIC BLOOD PRESSURE: 69 MMHG | TEMPERATURE: 98 F

## 2018-12-13 DIAGNOSIS — R43.2 DECREASED SENSE OF TASTE: ICD-10-CM

## 2018-12-13 DIAGNOSIS — K21.9 GASTROESOPHAGEAL REFLUX DISEASE WITHOUT ESOPHAGITIS: Primary | ICD-10-CM

## 2018-12-13 DIAGNOSIS — J30.89 ALLERGIC RHINITIS DUE TO OTHER ALLERGIC TRIGGER, UNSPECIFIED SEASONALITY: ICD-10-CM

## 2018-12-13 DIAGNOSIS — M72.2 PLANTAR FASCIITIS, BILATERAL: Primary | ICD-10-CM

## 2018-12-13 DIAGNOSIS — R51.9 HEADACHE DISORDER: ICD-10-CM

## 2018-12-13 DIAGNOSIS — R43.8 DECREASED SENSE OF SMELL: ICD-10-CM

## 2018-12-13 PROCEDURE — 99213 OFFICE O/P EST LOW 20 MIN: CPT | Performed by: PODIATRIST

## 2018-12-13 PROCEDURE — 99212 OFFICE O/P EST SF 10 MIN: CPT | Performed by: PODIATRIST

## 2018-12-13 PROCEDURE — 99214 OFFICE O/P EST MOD 30 MIN: CPT | Performed by: INTERNAL MEDICINE

## 2018-12-13 PROCEDURE — 99212 OFFICE O/P EST SF 10 MIN: CPT | Performed by: INTERNAL MEDICINE

## 2018-12-13 RX ORDER — FLUTICASONE PROPIONATE 50 MCG
2 SPRAY, SUSPENSION (ML) NASAL DAILY
Qty: 1 BOTTLE | Refills: 0 | Status: SHIPPED | OUTPATIENT
Start: 2018-12-13 | End: 2018-12-13

## 2018-12-13 RX ORDER — LEVOCETIRIZINE DIHYDROCHLORIDE 5 MG/1
5 TABLET, FILM COATED ORAL EVERY EVENING
Qty: 30 TABLET | Refills: 1 | Status: SHIPPED | OUTPATIENT
Start: 2018-12-13 | End: 2018-12-13

## 2018-12-13 RX ORDER — ESOMEPRAZOLE MAGNESIUM 40 MG/1
40 FOR SUSPENSION ORAL
Qty: 30 MG | Refills: 1 | Status: SHIPPED | OUTPATIENT
Start: 2018-12-13 | End: 2018-12-14

## 2018-12-13 RX ORDER — LORATADINE 10 MG/1
10 TABLET ORAL DAILY
Qty: 30 TABLET | Refills: 2 | Status: SHIPPED | OUTPATIENT
Start: 2018-12-13 | End: 2019-12-13

## 2018-12-13 RX ORDER — MOMETASONE 50 UG/1
2 SPRAY, METERED NASAL DAILY
Qty: 1 BOTTLE | Refills: 1 | Status: SHIPPED | OUTPATIENT
Start: 2018-12-13 | End: 2019-01-14

## 2018-12-13 NOTE — TELEPHONE ENCOUNTER
Per pharmacy fax meds not covered by insurance please fax alternative suggested by pharmacy : [facility], [facility] and for the 2nd med MOMETASONESPRMCG, FLUNISOLIDESPR        Current Outpatient Medications:   •  Levocetirizine Dihyd

## 2018-12-13 NOTE — PROGRESS NOTES
HPI:    Patient ID: Eladio Rader is a 66year old female.   Patient presents with:  Loss Of Smell Or Taste: Pt state that she is f/u with her loss of smell    Patient in office today for   follow up visit on smell and taste loss patient states that this is dizziness and headaches. Psychiatric/Behavioral: Negative for confusion. The patient is not nervous/anxious.                Current Outpatient Medications:  Esomeprazole Magnesium (NEXIUM) 40 MG Oral Powd Pack Take 40 mg by mouth every morning before luis antonio adenopathy. Neurological: She is alert and oriented to person, place, and time. Skin: Skin is warm and dry. Psychiatric: She has a normal mood and affect. Her behavior is normal.   Nursing note and vitals reviewed.       Blood pressure 109/69, pulse 6 and taking Excedrin for that that helps her but the headaches comes back  Patient advised not to use Excedrin due to GERD symptoms can use Tylenol 500 mg 2 tablets as needed once or twice daily  The MRI brain ordered  Patient referred to see neurologist fo

## 2018-12-13 NOTE — PROGRESS NOTES
HPI:    Patient ID: Chad Jesus is a 66year old female. This 16-year-old female presents for follow-up in reference to heel pain. She has benefited from the added support and control and believes that the anti-inflammatory has made a difference.   She

## 2018-12-14 ENCOUNTER — TELEPHONE (OUTPATIENT)
Dept: INTERNAL MEDICINE CLINIC | Facility: CLINIC | Age: 58
End: 2018-12-14

## 2018-12-14 RX ORDER — ESOMEPRAZOLE MAGNESIUM 40 MG/1
CAPSULE, DELAYED RELEASE ORAL
Qty: 30 CAPSULE | Refills: 1 | Status: SHIPPED | OUTPATIENT
Start: 2018-12-14 | End: 2019-07-02 | Stop reason: ALTCHOICE

## 2018-12-14 NOTE — TELEPHONE ENCOUNTER
Drug: nexium 40 mg dr westfall pkt for susp-30    Message : drug too expensive patient would like a cheaper alternative may we change to nexium capsules please send us a new rx if approved thanks

## 2019-01-14 RX ORDER — MOMETASONE FUROATE 50 UG/1
2 SPRAY, METERED NASAL DAILY
Qty: 1 BOTTLE | Refills: 1 | Status: SHIPPED | OUTPATIENT
Start: 2019-01-14 | End: 2023-08-04

## 2019-01-23 ENCOUNTER — OFFICE VISIT (OUTPATIENT)
Dept: INTERNAL MEDICINE CLINIC | Facility: CLINIC | Age: 59
End: 2019-01-23
Payer: COMMERCIAL

## 2019-01-23 VITALS
BODY MASS INDEX: 29.19 KG/M2 | HEIGHT: 64 IN | SYSTOLIC BLOOD PRESSURE: 122 MMHG | TEMPERATURE: 98 F | HEART RATE: 65 BPM | WEIGHT: 171 LBS | DIASTOLIC BLOOD PRESSURE: 83 MMHG

## 2019-01-23 DIAGNOSIS — J30.9 ALLERGIC RHINITIS, UNSPECIFIED SEASONALITY, UNSPECIFIED TRIGGER: ICD-10-CM

## 2019-01-23 DIAGNOSIS — R51.9 HEADACHE DISORDER: ICD-10-CM

## 2019-01-23 DIAGNOSIS — E78.00 PURE HYPERCHOLESTEROLEMIA: ICD-10-CM

## 2019-01-23 DIAGNOSIS — E04.9 GOITER: Primary | ICD-10-CM

## 2019-01-23 PROCEDURE — 99212 OFFICE O/P EST SF 10 MIN: CPT | Performed by: INTERNAL MEDICINE

## 2019-01-23 PROCEDURE — 99214 OFFICE O/P EST MOD 30 MIN: CPT | Performed by: INTERNAL MEDICINE

## 2019-01-23 NOTE — PROGRESS NOTES
HPI:    Patient ID: Juju Villegas is a 66year old female. Patient presents with:   Foot Pain  pt   State  Dental- Root  Canal and  Gums problems with pain  was on  antibioticks   For  2  Weeks amoxicilline  - form dentist   Still have taste and smell ptob Suspension 2 sprays by Nasal route daily.  In each nostril Disp: 1 Bottle Rfl: 1   Esomeprazole Magnesium 40 MG Oral Capsule Delayed Release Take 40 mg by mouth every morning 30-60 min before breakfast Disp: 30 capsule Rfl: 1   loratadine 10 MG Oral Tab [facility] normal and behavior is normal. Judgment and thought content normal. Her mood appears not anxious. Cognition and memory are normal. She does not exhibit a depressed mood.    Sleeping    Bad   Takes  Time  Fall  A  Sleep  Not   Waking  Up       Sleeps 8-9   N Metabolic Panel (14) [E]      Lipid Panel [E]      TSH W Reflex To Free T4 [E]      Urinalysis, Routine [E]      Meds This Visit:  Requested Prescriptions      No prescriptions requested or ordered in this encounter       Imaging & Referrals:  US THYROID (

## 2019-03-02 ENCOUNTER — APPOINTMENT (OUTPATIENT)
Dept: ULTRASOUND IMAGING | Facility: HOSPITAL | Age: 59
End: 2019-03-02
Attending: NURSE PRACTITIONER
Payer: COMMERCIAL

## 2019-03-02 ENCOUNTER — HOSPITAL ENCOUNTER (EMERGENCY)
Facility: HOSPITAL | Age: 59
Discharge: HOME | End: 2019-03-02
Payer: COMMERCIAL

## 2019-03-02 ENCOUNTER — NURSE TRIAGE (OUTPATIENT)
Dept: OTHER | Age: 59
End: 2019-03-02

## 2019-03-02 VITALS
DIASTOLIC BLOOD PRESSURE: 89 MMHG | HEART RATE: 55 BPM | BODY MASS INDEX: 29 KG/M2 | WEIGHT: 170 LBS | RESPIRATION RATE: 12 BRPM | TEMPERATURE: 99 F | OXYGEN SATURATION: 97 % | SYSTOLIC BLOOD PRESSURE: 125 MMHG

## 2019-03-02 DIAGNOSIS — K80.20 CALCULUS OF GALLBLADDER WITHOUT CHOLECYSTITIS WITHOUT OBSTRUCTION: Primary | ICD-10-CM

## 2019-03-02 LAB
ALBUMIN SERPL-MCNC: 3.9 G/DL (ref 3.4–5)
ALP LIVER SERPL-CCNC: 105 U/L (ref 46–118)
ALT SERPL-CCNC: 27 U/L (ref 13–56)
ANION GAP SERPL CALC-SCNC: 8 MMOL/L (ref 0–18)
AST SERPL-CCNC: 24 U/L (ref 15–37)
BASOPHILS # BLD AUTO: 0.02 X10(3) UL (ref 0–0.2)
BASOPHILS NFR BLD AUTO: 0.4 %
BILIRUB DIRECT SERPL-MCNC: 0.2 MG/DL (ref 0–0.2)
BILIRUB SERPL-MCNC: 0.7 MG/DL (ref 0.1–2)
BILIRUB UR QL: NEGATIVE
BUN BLD-MCNC: 13 MG/DL (ref 7–18)
BUN/CREAT SERPL: 17.6 (ref 10–20)
CALCIUM BLD-MCNC: 9.3 MG/DL (ref 8.5–10.1)
CHLORIDE SERPL-SCNC: 109 MMOL/L (ref 98–107)
CO2 SERPL-SCNC: 25 MMOL/L (ref 21–32)
COLOR UR: YELLOW
CREAT BLD-MCNC: 0.74 MG/DL (ref 0.55–1.02)
DEPRECATED RDW RBC AUTO: 42.5 FL (ref 35.1–46.3)
EOSINOPHIL # BLD AUTO: 0.1 X10(3) UL (ref 0–0.7)
EOSINOPHIL NFR BLD AUTO: 2 %
ERYTHROCYTE [DISTWIDTH] IN BLOOD BY AUTOMATED COUNT: 13.3 % (ref 11–15)
GLUCOSE BLD-MCNC: 97 MG/DL (ref 70–99)
GLUCOSE UR-MCNC: NEGATIVE MG/DL
HCT VFR BLD AUTO: 42 % (ref 35–48)
HGB BLD-MCNC: 13.3 G/DL (ref 12–16)
HGB UR QL STRIP.AUTO: NEGATIVE
IMM GRANULOCYTES # BLD AUTO: 0 X10(3) UL (ref 0–1)
IMM GRANULOCYTES NFR BLD: 0 %
KETONES UR-MCNC: NEGATIVE MG/DL
LEUKOCYTE ESTERASE UR QL STRIP.AUTO: NEGATIVE
LIPASE SERPL-CCNC: 197 U/L (ref 73–393)
LYMPHOCYTES # BLD AUTO: 1.63 X10(3) UL (ref 1–4)
LYMPHOCYTES NFR BLD AUTO: 32.4 %
M PROTEIN MFR SERPL ELPH: 8.2 G/DL (ref 6.4–8.2)
MCH RBC QN AUTO: 27.7 PG (ref 26–34)
MCHC RBC AUTO-ENTMCNC: 31.7 G/DL (ref 31–37)
MCV RBC AUTO: 87.3 FL (ref 80–100)
MONOCYTES # BLD AUTO: 0.28 X10(3) UL (ref 0.1–1)
MONOCYTES NFR BLD AUTO: 5.6 %
NEUTROPHILS # BLD AUTO: 3 X10 (3) UL (ref 1.5–7.7)
NEUTROPHILS # BLD AUTO: 3 X10(3) UL (ref 1.5–7.7)
NEUTROPHILS NFR BLD AUTO: 59.6 %
NITRITE UR QL STRIP.AUTO: NEGATIVE
OSMOLALITY SERPL CALC.SUM OF ELEC: 294 MOSM/KG (ref 275–295)
PH UR: 5 [PH] (ref 5–8)
PLATELET # BLD AUTO: 292 10(3)UL (ref 150–450)
POTASSIUM SERPL-SCNC: 3.5 MMOL/L (ref 3.5–5.1)
PROT UR-MCNC: NEGATIVE MG/DL
RBC # BLD AUTO: 4.81 X10(6)UL (ref 3.8–5.3)
SODIUM SERPL-SCNC: 142 MMOL/L (ref 136–145)
SP GR UR STRIP: 1.03 (ref 1–1.03)
UROBILINOGEN UR STRIP-ACNC: <2
VIT C UR-MCNC: NEGATIVE MG/DL
WBC # BLD AUTO: 5 X10(3) UL (ref 4–11)

## 2019-03-02 PROCEDURE — 81003 URINALYSIS AUTO W/O SCOPE: CPT | Performed by: NURSE PRACTITIONER

## 2019-03-02 PROCEDURE — 99284 EMERGENCY DEPT VISIT MOD MDM: CPT

## 2019-03-02 PROCEDURE — 96374 THER/PROPH/DIAG INJ IV PUSH: CPT

## 2019-03-02 PROCEDURE — 76705 ECHO EXAM OF ABDOMEN: CPT | Performed by: NURSE PRACTITIONER

## 2019-03-02 PROCEDURE — 85025 COMPLETE CBC W/AUTO DIFF WBC: CPT | Performed by: NURSE PRACTITIONER

## 2019-03-02 PROCEDURE — 80048 BASIC METABOLIC PNL TOTAL CA: CPT | Performed by: NURSE PRACTITIONER

## 2019-03-02 PROCEDURE — S0028 INJECTION, FAMOTIDINE, 20 MG: HCPCS | Performed by: NURSE PRACTITIONER

## 2019-03-02 PROCEDURE — 96361 HYDRATE IV INFUSION ADD-ON: CPT

## 2019-03-02 PROCEDURE — 80076 HEPATIC FUNCTION PANEL: CPT | Performed by: NURSE PRACTITIONER

## 2019-03-02 PROCEDURE — 96375 TX/PRO/DX INJ NEW DRUG ADDON: CPT

## 2019-03-02 PROCEDURE — 83690 ASSAY OF LIPASE: CPT | Performed by: NURSE PRACTITIONER

## 2019-03-02 PROCEDURE — 96372 THER/PROPH/DIAG INJ SC/IM: CPT

## 2019-03-02 RX ORDER — DICYCLOMINE HYDROCHLORIDE 10 MG/ML
20 INJECTION INTRAMUSCULAR ONCE
Status: COMPLETED | OUTPATIENT
Start: 2019-03-02 | End: 2019-03-02

## 2019-03-02 RX ORDER — FAMOTIDINE 10 MG/ML
20 INJECTION, SOLUTION INTRAVENOUS ONCE
Status: COMPLETED | OUTPATIENT
Start: 2019-03-02 | End: 2019-03-02

## 2019-03-02 RX ORDER — TRAMADOL HYDROCHLORIDE 50 MG/1
50 TABLET ORAL EVERY 6 HOURS PRN
Qty: 15 TABLET | Refills: 0 | Status: SHIPPED | OUTPATIENT
Start: 2019-03-02 | End: 2019-03-09

## 2019-03-02 RX ORDER — ONDANSETRON 2 MG/ML
4 INJECTION INTRAMUSCULAR; INTRAVENOUS ONCE
Status: COMPLETED | OUTPATIENT
Start: 2019-03-02 | End: 2019-03-02

## 2019-03-02 RX ORDER — KETOROLAC TROMETHAMINE 30 MG/ML
30 INJECTION, SOLUTION INTRAMUSCULAR; INTRAVENOUS ONCE
Status: COMPLETED | OUTPATIENT
Start: 2019-03-02 | End: 2019-03-02

## 2019-03-02 NOTE — ED INITIAL ASSESSMENT (HPI)
Abdominal pain and nausea x 2-3 days. Patient reports constipation with one small episode of diarrhea.   Patient also reports episodes of dizziness (spinning)

## 2019-03-02 NOTE — ED PROVIDER NOTES
Patient Seen in: [facility] AND [facility] Emergency Department    History   CC: abd pain  HPI: Heraclio Leblanc 64year old female  who presents to the ER c/o diffuse upper abd pain worse after eating. +nausea w/o vomiting. +1 episodes loose stool yesterday, other Constitutional and vital signs reviewed.         Physical Exam     ED Triage Vitals [03/02/19 1047]   /87   Pulse 62   Resp 20   Temp 98.7 °F (37.1 °C)   Temp src Temporal   SpO2 99 %   O2 Device None (Room air)       Current:/89   Pulse 5 Final result                 Please view results for these tests on the individual orders.    RAINBOW DRAW BLUE   RAINBOW DRAW LAVENDER   RAINBOW DRAW LIGHT GREEN   RAINBOW DRAW GOLD   CBC W/ DIFFERENTIAL       MDM   US GALLBLADDER (CPT=76705) (Maddie Natalie Hassan MD  [facility]  [phone]    Schedule an appointment as soon as possible for a visit in 2 days        Medications Prescribed:  Current Discharge Medication List    START taking these medications    traMADol HCl 5

## 2019-03-04 ENCOUNTER — TELEPHONE (OUTPATIENT)
Dept: INTERNAL MEDICINE CLINIC | Facility: CLINIC | Age: 59
End: 2019-03-04

## 2019-03-04 NOTE — TELEPHONE ENCOUNTER
Pt was seen at [address] [place] [address] on Saturday 3-2-19 and was told to follow up with her pcp today or tomorrow. Pt would like to see Dr. Deyvi Finnegan today or tomorrow. There are no available appointments.

## 2019-03-06 ENCOUNTER — OFFICE VISIT (OUTPATIENT)
Dept: GASTROENTEROLOGY | Facility: CLINIC | Age: 59
End: 2019-03-06
Payer: COMMERCIAL

## 2019-03-06 ENCOUNTER — TELEPHONE (OUTPATIENT)
Dept: GASTROENTEROLOGY | Facility: CLINIC | Age: 59
End: 2019-03-06

## 2019-03-06 VITALS
HEART RATE: 62 BPM | HEIGHT: 64 IN | SYSTOLIC BLOOD PRESSURE: 123 MMHG | DIASTOLIC BLOOD PRESSURE: 77 MMHG | WEIGHT: 168.63 LBS | BODY MASS INDEX: 28.79 KG/M2

## 2019-03-06 DIAGNOSIS — R10.9 ABDOMINAL PAIN, UNSPECIFIED ABDOMINAL LOCATION: Primary | ICD-10-CM

## 2019-03-06 DIAGNOSIS — Z01.818 PREOPERATIVE CLEARANCE: ICD-10-CM

## 2019-03-06 DIAGNOSIS — R93.2 ABNORMAL ULTRASOUND OF GALLBLADDER: ICD-10-CM

## 2019-03-06 DIAGNOSIS — R10.13 EPIGASTRIC PAIN: Primary | ICD-10-CM

## 2019-03-06 PROCEDURE — 99214 OFFICE O/P EST MOD 30 MIN: CPT | Performed by: INTERNAL MEDICINE

## 2019-03-06 RX ORDER — ESOMEPRAZOLE MAGNESIUM 40 MG/1
40 CAPSULE, DELAYED RELEASE ORAL
Qty: 90 CAPSULE | Refills: 0 | Status: SHIPPED | OUTPATIENT
Start: 2019-03-06 | End: 2019-04-10

## 2019-03-07 ENCOUNTER — ANESTHESIA (OUTPATIENT)
Dept: ENDOSCOPY | Facility: HOSPITAL | Age: 59
End: 2019-03-07

## 2019-03-07 ENCOUNTER — HOSPITAL ENCOUNTER (OUTPATIENT)
Facility: HOSPITAL | Age: 59
Setting detail: HOSPITAL OUTPATIENT SURGERY
Discharge: HOME | End: 2019-03-07
Attending: INTERNAL MEDICINE | Admitting: INTERNAL MEDICINE
Payer: COMMERCIAL

## 2019-03-07 ENCOUNTER — ANESTHESIA EVENT (OUTPATIENT)
Dept: ENDOSCOPY | Facility: HOSPITAL | Age: 59
End: 2019-03-07

## 2019-03-07 VITALS
HEIGHT: 64 IN | BODY MASS INDEX: 28.68 KG/M2 | SYSTOLIC BLOOD PRESSURE: 125 MMHG | OXYGEN SATURATION: 96 % | DIASTOLIC BLOOD PRESSURE: 78 MMHG | RESPIRATION RATE: 18 BRPM | WEIGHT: 168 LBS | HEART RATE: 63 BPM

## 2019-03-07 DIAGNOSIS — R10.9 ABDOMINAL PAIN, UNSPECIFIED ABDOMINAL LOCATION: ICD-10-CM

## 2019-03-07 PROBLEM — K29.60 EROSIVE GASTRITIS: Status: ACTIVE | Noted: 2019-03-07

## 2019-03-07 PROCEDURE — 0DB68ZX EXCISION OF STOMACH, VIA NATURAL OR ARTIFICIAL OPENING ENDOSCOPIC, DIAGNOSTIC: ICD-10-PCS | Performed by: INTERNAL MEDICINE

## 2019-03-07 PROCEDURE — G0500 MOD SEDAT ENDO SERVICE >5YRS: HCPCS | Performed by: INTERNAL MEDICINE

## 2019-03-07 PROCEDURE — 43239 EGD BIOPSY SINGLE/MULTIPLE: CPT | Performed by: INTERNAL MEDICINE

## 2019-03-07 RX ORDER — SODIUM CHLORIDE 0.9 % (FLUSH) 0.9 %
10 SYRINGE (ML) INJECTION AS NEEDED
Status: DISCONTINUED | OUTPATIENT
Start: 2019-03-07 | End: 2019-03-07

## 2019-03-07 RX ORDER — MIDAZOLAM HYDROCHLORIDE 1 MG/ML
1 INJECTION INTRAMUSCULAR; INTRAVENOUS EVERY 5 MIN PRN
Status: DISCONTINUED | OUTPATIENT
Start: 2019-03-07 | End: 2019-03-07

## 2019-03-07 RX ORDER — SODIUM CHLORIDE, SODIUM LACTATE, POTASSIUM CHLORIDE, CALCIUM CHLORIDE 600; 310; 30; 20 MG/100ML; MG/100ML; MG/100ML; MG/100ML
INJECTION, SOLUTION INTRAVENOUS CONTINUOUS
Status: DISCONTINUED | OUTPATIENT
Start: 2019-03-07 | End: 2019-03-07

## 2019-03-07 RX ORDER — MIDAZOLAM HYDROCHLORIDE 1 MG/ML
INJECTION INTRAMUSCULAR; INTRAVENOUS
Status: DISCONTINUED | OUTPATIENT
Start: 2019-03-07 | End: 2019-03-07

## 2019-03-07 NOTE — TELEPHONE ENCOUNTER
Scheduled for:  EGD 66730  Provider Name:   Date:  3/7/19  Location:  [facility]  Sedation: iv    Time:  11AM PT TOLD TO ARRIVE AT 10AM  Prep:npo AFTER MIDNIGHT  Meds/Allergies Reconciled?:  PHYSICIAN REVIEWED  Diagnosis with codes:  EPIGASTRIC PAIN B19;29

## 2019-03-07 NOTE — BRIEF OP NOTE
Pre-Operative Diagnosis: Epigastric abdominal pain     Post-Operative Diagnosis: Erosive gastritis     Procedure Performed:   Procedure(s):  ESOPHAGOGASTRODUODENOSCOPY (EGD) with biopsies    Surgeon(s) and Role:     * Va Chen MD - Justine Cassidy

## 2019-03-07 NOTE — OPERATIVE REPORT
[facility]    PATIENT'S NAME: IRLEY OZUNA   ATTENDING PHYSICIAN: Zack Pappas MD   OPERATING PHYSICIAN: Zack Pappas MD   PATIENT ACCOUNT#:   [id number]    LOCATION:  [facility] ROOM 4 [address] entirety. There was moderate to severe gastric antral erythema with erosions of the gastric antrum but no ulcerations. Photographs and biopsies x4 were taken of the gastric antrum.   There was also erosive gastritis noted in the gastric body, and biopsies

## 2019-03-07 NOTE — H&P
History & Physical Examination    Patient Name: Tegan Hutton  MRN: [id number]  CSN: [id number]  YOB: 1960    Diagnosis: Epigastric abdominal pain      Medications Prior to Admission:  Esomeprazole Magnesium 40 MG Oral Capsule Delayed Release Never Smoker      Smokeless tobacco: Never Used      Tobacco comment: per NG    Alcohol use: No      Comment: per NG      SYSTEM Check if Physical Exam is Normal If not normal, please explain:   YKLEE Prakash [ Mahogany Dudley [ Samara Shells    ABDOM

## 2019-03-07 NOTE — PATIENT INSTRUCTIONS
1.  Avoid all NSAIDs! !! this includes Excedrin, aspirin, Advil, Aleve, ibuprofen, naproxen, meloxicam.  If you are unsure what is an NSAID then call my office.     2.  Resume Esomeprazole 40 mg daily before breakfast.  Or you can try other PPIs just call ou

## 2019-03-11 ENCOUNTER — TELEPHONE (OUTPATIENT)
Dept: GASTROENTEROLOGY | Facility: CLINIC | Age: 59
End: 2019-03-11

## 2019-03-11 RX ORDER — OMEPRAZOLE 20 MG/1
20 CAPSULE, DELAYED RELEASE ORAL
Qty: 60 CAPSULE | Refills: 0 | Status: SHIPPED | OUTPATIENT
Start: 2019-03-11 | End: 2019-04-10

## 2019-03-11 RX ORDER — CLARITHROMYCIN 500 MG/1
500 TABLET, COATED ORAL 2 TIMES DAILY
Qty: 28 TABLET | Refills: 0 | Status: SHIPPED | OUTPATIENT
Start: 2019-03-11 | End: 2019-03-25

## 2019-03-11 RX ORDER — AMOXICILLIN 500 MG/1
1000 TABLET, FILM COATED ORAL 2 TIMES DAILY
Qty: 60 TABLET | Refills: 0 | Status: SHIPPED | OUTPATIENT
Start: 2019-03-11 | End: 2019-03-25

## 2019-03-11 NOTE — TELEPHONE ENCOUNTER
Please call pt, read her the letter, then send letter, make sure that Erxs have been sent with the full sig, then close the encounter.

## 2019-03-26 NOTE — TELEPHONE ENCOUNTER
Still unable to reach the pt. She did  her antibiotics on 03/14/19 and she takes esomeprazole daily. She did not receive the omeprazole prescription.     I did recall the stool test for 12 weeks

## 2019-04-09 ENCOUNTER — HOSPITAL ENCOUNTER (EMERGENCY)
Facility: HOSPITAL | Age: 59
Discharge: HOME | End: 2019-04-09
Attending: EMERGENCY MEDICINE
Payer: COMMERCIAL

## 2019-04-09 VITALS
SYSTOLIC BLOOD PRESSURE: 142 MMHG | OXYGEN SATURATION: 99 % | BODY MASS INDEX: 28.68 KG/M2 | HEIGHT: 64 IN | TEMPERATURE: 98 F | DIASTOLIC BLOOD PRESSURE: 78 MMHG | WEIGHT: 168 LBS | RESPIRATION RATE: 16 BRPM | HEART RATE: 74 BPM

## 2019-04-09 DIAGNOSIS — N30.01 ACUTE CYSTITIS WITH HEMATURIA: Primary | ICD-10-CM

## 2019-04-09 PROCEDURE — 87086 URINE CULTURE/COLONY COUNT: CPT | Performed by: EMERGENCY MEDICINE

## 2019-04-09 PROCEDURE — 87088 URINE BACTERIA CULTURE: CPT | Performed by: EMERGENCY MEDICINE

## 2019-04-09 PROCEDURE — 87186 SC STD MICRODIL/AGAR DIL: CPT | Performed by: EMERGENCY MEDICINE

## 2019-04-09 PROCEDURE — 81001 URINALYSIS AUTO W/SCOPE: CPT | Performed by: EMERGENCY MEDICINE

## 2019-04-09 PROCEDURE — 99284 EMERGENCY DEPT VISIT MOD MDM: CPT

## 2019-04-09 PROCEDURE — 96374 THER/PROPH/DIAG INJ IV PUSH: CPT

## 2019-04-09 RX ORDER — ONDANSETRON 2 MG/ML
4 INJECTION INTRAMUSCULAR; INTRAVENOUS ONCE
Status: COMPLETED | OUTPATIENT
Start: 2019-04-09 | End: 2019-04-09

## 2019-04-09 RX ORDER — CEPHALEXIN 500 MG/1
500 CAPSULE ORAL 4 TIMES DAILY
Qty: 28 CAPSULE | Refills: 0 | Status: SHIPPED | OUTPATIENT
Start: 2019-04-09 | End: 2019-04-16

## 2019-04-09 NOTE — ED PROVIDER NOTES
Patient Seen in: [facility] Emergency Department    History   No chief complaint on file. Stated Complaint: Hematuria    HPI    51-year-old female with history of gastritis, here with complaints of hematuria, dysuria for the past 4 hours.   Marielos Negative for weakness, light-headedness and headaches. All other systems reviewed and are negative. Positive for stated complaint: Hematuria  Other systems are as noted in HPI. Constitutional and vital signs reviewed.       All other systems reviewe hour(s))   URINALYSIS WITH CULTURE REFLEX    Collection Time: 04/09/19  2:25 AM   Result Value Ref Range    Urine Color Yellow Yellow    Clarity Urine Cloudy (A) Clear    Spec Gravity 1.028 1.002 - 1.035    pH Urine 5.0 5.0 - 8.0    Protein Urine 100  (A) reviewing the diagnostics, multiple initial diagnoses were considered based on the presenting problem including pyelonephritis, UTI, candidiasis.       Disposition and Plan     Clinical Impression:  Acute cystitis with hematuria  (primary encounter diagnosi

## 2019-04-09 NOTE — ED INITIAL ASSESSMENT (HPI)
Pt reports to ED with complaints of hematuria, urgency, dysuria, and bloating. Pt was seen for abd pain and cholecystitis 3 weeks ago, though he PMD believes the pain is related to her bladder. Pt denies N/V/D or fevers.

## 2019-04-10 ENCOUNTER — OFFICE VISIT (OUTPATIENT)
Dept: INTERNAL MEDICINE CLINIC | Facility: CLINIC | Age: 59
End: 2019-04-10
Payer: COMMERCIAL

## 2019-04-10 VITALS
RESPIRATION RATE: 20 BRPM | WEIGHT: 170.19 LBS | HEIGHT: 64 IN | SYSTOLIC BLOOD PRESSURE: 132 MMHG | DIASTOLIC BLOOD PRESSURE: 80 MMHG | HEART RATE: 65 BPM | BODY MASS INDEX: 29.06 KG/M2 | TEMPERATURE: 98 F

## 2019-04-10 DIAGNOSIS — K29.60 EROSIVE GASTRITIS: Primary | ICD-10-CM

## 2019-04-10 DIAGNOSIS — N39.0 URINARY TRACT INFECTION WITHOUT HEMATURIA, SITE UNSPECIFIED: ICD-10-CM

## 2019-04-10 PROCEDURE — 99214 OFFICE O/P EST MOD 30 MIN: CPT | Performed by: INTERNAL MEDICINE

## 2019-04-10 PROCEDURE — 99212 OFFICE O/P EST SF 10 MIN: CPT | Performed by: INTERNAL MEDICINE

## 2019-04-10 NOTE — PROGRESS NOTES
HPI:    Patient ID: Leroy Myles is a 64year old female. Patient presents with:  UTI: Pt state that she is f/u from recent UTI    Patient in office today for follow up visit uti  sy and egd - she  Had  Recently completed .   Patient  States feeling well mg total) by mouth 2 (two) times daily before meals. In comb with clarithromycin and amoxicillin, then daily until completed Disp: 60 capsule Rfl: 0   Mometasone Furoate 50 MCG/ACT Nasal Suspension 2 sprays by Nasal route daily.  In each nostril Disp: 1 Bot warm and dry. Psychiatric: She has a normal mood and affect. Her behavior is normal.   Nursing note and vitals reviewed. Blood pressure 132/80, pulse 65, temperature 97.9 °F (36.6 °C), temperature source Oral, resp.  rate 20, height 5' 4\" (1.626 m), w Imaging & Referrals:  None       AO#[id number]

## 2019-04-23 ENCOUNTER — OFFICE VISIT (OUTPATIENT)
Dept: INTERNAL MEDICINE CLINIC | Facility: CLINIC | Age: 59
End: 2019-04-23
Payer: COMMERCIAL

## 2019-04-23 ENCOUNTER — NURSE TRIAGE (OUTPATIENT)
Dept: OTHER | Age: 59
End: 2019-04-23

## 2019-04-23 VITALS
TEMPERATURE: 98 F | SYSTOLIC BLOOD PRESSURE: 114 MMHG | HEART RATE: 61 BPM | RESPIRATION RATE: 20 BRPM | WEIGHT: 168.19 LBS | HEIGHT: 64 IN | DIASTOLIC BLOOD PRESSURE: 75 MMHG | BODY MASS INDEX: 28.71 KG/M2

## 2019-04-23 DIAGNOSIS — R30.0 DYSURIA: ICD-10-CM

## 2019-04-23 DIAGNOSIS — K21.9 GASTROESOPHAGEAL REFLUX DISEASE WITHOUT ESOPHAGITIS: ICD-10-CM

## 2019-04-23 DIAGNOSIS — R39.9 UTI SYMPTOMS: Primary | ICD-10-CM

## 2019-04-23 DIAGNOSIS — K29.60 EROSIVE GASTRITIS: ICD-10-CM

## 2019-04-23 PROCEDURE — 99212 OFFICE O/P EST SF 10 MIN: CPT | Performed by: INTERNAL MEDICINE

## 2019-04-23 PROCEDURE — 81002 URINALYSIS NONAUTO W/O SCOPE: CPT | Performed by: INTERNAL MEDICINE

## 2019-04-23 PROCEDURE — 99214 OFFICE O/P EST MOD 30 MIN: CPT | Performed by: INTERNAL MEDICINE

## 2019-04-23 NOTE — PROGRESS NOTES
HPI:    Patient ID: Adrien Fragoso is a 64year old female. Patient presents with:  UTI: Pt state that she has burning with voiding that started last night      UTI   This is a new problem. The current episode started yesterday. The problem occurs daily.  Ximena Souza total) by mouth daily. Disp: 30 tablet Rfl: 2   acetaminophen 325 MG Oral Tab Take 325 mg by mouth every 6 (six) hours as needed for Pain.  Disp:  Rfl:      Allergies:No Known Allergies   PHYSICAL EXAM:   Physical Exam   Constitutional: She is oriented to p diagnosis)  Dysuria  Advised patient to increase water intake  Patient advised to use Tylenol 500 mg 3 times daily as needed  Urine and urine culture sent   Advised patient also to use vaginal lubricants due to some vaginal dryness           History of ero

## 2019-04-23 NOTE — TELEPHONE ENCOUNTER
Action Requested: Summary for Provider     []  Critical Lab, Recommendations Needed  [] Need Additional Advice  []   FYI    []   Need Orders  [] Need Medications Sent to Pharmacy  []  Other     SUMMARY:   Appointment made for today 4/23/19    The patient a

## 2019-04-24 ENCOUNTER — TELEPHONE (OUTPATIENT)
Dept: INTERNAL MEDICINE CLINIC | Facility: CLINIC | Age: 59
End: 2019-04-24

## 2019-04-25 ENCOUNTER — TELEPHONE (OUTPATIENT)
Dept: INTERNAL MEDICINE CLINIC | Facility: CLINIC | Age: 59
End: 2019-04-25

## 2019-04-25 ENCOUNTER — LAB ENCOUNTER (OUTPATIENT)
Dept: LAB | Age: 59
End: 2019-04-25
Attending: INTERNAL MEDICINE
Payer: COMMERCIAL

## 2019-04-25 DIAGNOSIS — E78.00 PURE HYPERCHOLESTEROLEMIA: ICD-10-CM

## 2019-04-25 PROCEDURE — 80053 COMPREHEN METABOLIC PANEL: CPT

## 2019-04-25 PROCEDURE — 85025 COMPLETE CBC W/AUTO DIFF WBC: CPT

## 2019-04-25 PROCEDURE — 36415 COLL VENOUS BLD VENIPUNCTURE: CPT

## 2019-04-25 PROCEDURE — 80061 LIPID PANEL: CPT

## 2019-04-25 PROCEDURE — 84443 ASSAY THYROID STIM HORMONE: CPT

## 2019-04-25 PROCEDURE — 81001 URINALYSIS AUTO W/SCOPE: CPT

## 2019-04-25 RX ORDER — CIPROFLOXACIN 500 MG/1
500 TABLET, FILM COATED ORAL 2 TIMES DAILY
Qty: 14 TABLET | Refills: 0 | Status: SHIPPED | OUTPATIENT
Start: 2019-04-25 | End: 2019-07-02 | Stop reason: ALTCHOICE

## 2019-04-25 NOTE — TELEPHONE ENCOUNTER
Please advise to the communication below. Pt is requesting a note to stay off work longer. Is it ok to generated the note? Thank you. Maria Esther Rivera .Please respond to pool: MABLE WHARTON LPN/ROSALES

## 2019-04-25 NOTE — TELEPHONE ENCOUNTER
Pt was notified and informed that the note to return back to work is ready for  and it was asked if the note can be picked up at [facility]. Pt was informed that the note will be ready in the morning with understanding voiced.

## 2019-04-25 NOTE — TELEPHONE ENCOUNTER
Pt came in to [facility] requesting note for work. Pt was in the office on 04/23 and states she needs a note that will excuse her form 04/23/19 to 04/25/19 stating pt will return on 04/26/19. Please call when note is ready for .

## 2019-04-29 NOTE — PROGRESS NOTES
Please call patient with blood test results. Kidney and liver function are normal, no anemia. Cholesterol is mildly elevated  - keep with lw fat and sugar/carb  diet - exerse Cheryle Broad  Regularly   sugar is normal,  Thyroid hormone is normal as well.     C

## 2019-05-01 NOTE — TELEPHONE ENCOUNTER
EV please advice, esomeprazole is on pt's current list not requested omeprazole. Okay to switch? Refill passed per [facility], [facility] protocol.   Refill Protocol Appointment Criteria  · Appointment scheduled in the past 12 months or in the next 3 months  R

## 2019-05-02 RX ORDER — OMEPRAZOLE 40 MG/1
CAPSULE, DELAYED RELEASE ORAL
Qty: 60 CAPSULE | Refills: 0 | Status: SHIPPED | OUTPATIENT
Start: 2019-05-02 | End: 2019-06-07

## 2019-06-07 ENCOUNTER — TELEPHONE (OUTPATIENT)
Dept: GASTROENTEROLOGY | Facility: CLINIC | Age: 59
End: 2019-06-07

## 2019-06-07 ENCOUNTER — TELEPHONE (OUTPATIENT)
Dept: INTERNAL MEDICINE CLINIC | Facility: CLINIC | Age: 59
End: 2019-06-07

## 2019-06-07 DIAGNOSIS — K29.70 HELICOBACTER POSITIVE GASTRITIS: Primary | ICD-10-CM

## 2019-06-07 DIAGNOSIS — B96.81 HELICOBACTER POSITIVE GASTRITIS: Primary | ICD-10-CM

## 2019-06-07 RX ORDER — OMEPRAZOLE 40 MG/1
CAPSULE, DELAYED RELEASE ORAL
Qty: 180 CAPSULE | Refills: 1 | Status: SHIPPED | OUTPATIENT
Start: 2019-06-07 | End: 2019-09-04

## 2019-06-07 NOTE — TELEPHONE ENCOUNTER
Refill passed per [facility] protocol.   Refill Protocol Appointment Criteria  · Appointment scheduled in the past 12 months or in the next 3 months  Recent Outpatient Visits            1 month ago UTI symptoms    [facility], [address]

## 2019-06-07 NOTE — TELEPHONE ENCOUNTER
----- Message from Victorino Nicolas RN sent at 3/26/2019  6:30 PM CDT -----  Regarding: recall h pylori stool test  Recall h pylori stool test in 12 weeks per EBS.  Started antibiotics on 03/14/19

## 2019-06-07 NOTE — TELEPHONE ENCOUNTER
[facility] calling to advise will be faxing over detailed written order for Pt today, 6/7.

## 2019-06-07 NOTE — TELEPHONE ENCOUNTER
Routed to Office on call Dr Devon Robison for [facility] out of the office. Pt treated with clarithromycin and amoxicillin and omeprazole on 03/11/19.     This is a f/u stool test. Will advise pt to hold daily esomeprazole for 2 weeks prior to stool test

## 2019-06-10 NOTE — TELEPHONE ENCOUNTER
Stool test ordered for H.pylori re-testing. Please hold PPi for 2 weeks and have patient check stool which I ordered.  Thx

## 2019-06-12 NOTE — TELEPHONE ENCOUNTER
Attempted to contact the pt but no voicemail has been set up. Will wait for return call. Please send call to [phone] today until 530 and ask for Ashley Medrano. Thank you.

## 2019-07-02 ENCOUNTER — OFFICE VISIT (OUTPATIENT)
Dept: INTERNAL MEDICINE CLINIC | Facility: CLINIC | Age: 59
End: 2019-07-02
Payer: COMMERCIAL

## 2019-07-02 VITALS
DIASTOLIC BLOOD PRESSURE: 73 MMHG | TEMPERATURE: 98 F | WEIGHT: 167 LBS | SYSTOLIC BLOOD PRESSURE: 114 MMHG | HEART RATE: 65 BPM | BODY MASS INDEX: 29 KG/M2

## 2019-07-02 DIAGNOSIS — R43.2 DECREASED SENSE OF TASTE: ICD-10-CM

## 2019-07-02 DIAGNOSIS — M54.50 BILATERAL LOW BACK PAIN WITHOUT SCIATICA, UNSPECIFIED CHRONICITY: Primary | ICD-10-CM

## 2019-07-02 DIAGNOSIS — R51.9 HEADACHE DISORDER: ICD-10-CM

## 2019-07-02 PROCEDURE — 99214 OFFICE O/P EST MOD 30 MIN: CPT | Performed by: INTERNAL MEDICINE

## 2019-07-02 RX ORDER — CYCLOBENZAPRINE HCL 10 MG
5 TABLET ORAL NIGHTLY PRN
Qty: 15 TABLET | Refills: 0 | Status: SHIPPED | OUTPATIENT
Start: 2019-07-02 | End: 2019-07-17

## 2019-07-02 NOTE — PROGRESS NOTES
HPI:    Patient ID: Geovanna Cardozo is a 64year old female.   Patient presents with:  Back Pain: back brace order    Patient in office today for Lower back pains  For months mild pain no   Need for pain medications pt.state seeing  Foot  Dr and  [facility] AND AT BEDTIME Disp: 180 capsule Rfl: 1   Mometasone Furoate 50 MCG/ACT Nasal Suspension 2 sprays by Nasal route daily. In each nostril Disp: 1 Bottle Rfl: 1   loratadine 10 MG Oral Tab Take 1 tablet (10 mg total) by mouth daily.  Disp: 30 tablet Rfl: 2   a reviewed. Blood pressure 114/73, pulse 65, temperature 98.2 °F (36.8 °C), temperature source Oral, weight 167 lb (75.8 kg).                ASSESSMENT/PLAN:   Bilateral low back pain without sciatica, unspecified chronicity  (primary encounter diagnosis)  1

## 2019-07-18 ENCOUNTER — TELEPHONE (OUTPATIENT)
Dept: PODIATRY CLINIC | Facility: CLINIC | Age: 59
End: 2019-07-18

## 2019-07-18 ENCOUNTER — OFFICE VISIT (OUTPATIENT)
Dept: PODIATRY CLINIC | Facility: CLINIC | Age: 59
End: 2019-07-18
Payer: COMMERCIAL

## 2019-07-18 DIAGNOSIS — M72.2 PLANTAR FASCIITIS, BILATERAL: Primary | ICD-10-CM

## 2019-07-18 PROCEDURE — L3000 FT INSERT UCB BERKELEY SHELL: HCPCS | Performed by: PODIATRIST

## 2019-07-18 PROCEDURE — 29799 UNLISTED PX CASTING/STRPG: CPT | Performed by: PODIATRIST

## 2019-07-18 NOTE — PROGRESS NOTES
HPI:    Patient ID: Nayana Barr is a 64year old female. This 53-year-old female presents for long-term orthotic control. She is accompanied by family and they suggest that they have sought the appropriate approval for this to occur.   After discussion

## 2019-08-08 ENCOUNTER — APPOINTMENT (OUTPATIENT)
Dept: LAB | Age: 59
End: 2019-08-08
Attending: INTERNAL MEDICINE
Payer: COMMERCIAL

## 2019-08-08 PROCEDURE — 87338 HPYLORI STOOL AG IA: CPT | Performed by: INTERNAL MEDICINE

## 2019-08-13 LAB — H PYLORI AG STL QL IA: POSITIVE

## 2019-08-19 ENCOUNTER — TELEPHONE (OUTPATIENT)
Dept: GASTROENTEROLOGY | Facility: CLINIC | Age: 59
End: 2019-08-19

## 2019-08-19 NOTE — TELEPHONE ENCOUNTER
Patient known to Pat Sanchez in our GI clinic, then seen by Dr. Lisa Atkins for scope in March 2019. Was treated for Hpylori infection. I have not seen this patient, but as office on call I ordered a stool test for follow-up.  Stool test ordered for eradication

## 2019-08-20 NOTE — TELEPHONE ENCOUNTER
Per chart patient prefers [place]. Called to book routine follow up with [facility], CINDY LMTCMANISH. When pt calls back please book appt.

## 2019-08-20 NOTE — TELEPHONE ENCOUNTER
Noted. Thank you so much for the help, Dr. Devon Robison.     Nursing: I would be happy to f/u w/ pt in office to discuss H Pylori/tx

## 2019-08-22 NOTE — TELEPHONE ENCOUNTER
Future Appointments   Date Time Provider Danay Molina   9/4/2019 10:00 AM MARTA [facility] RACHAEL FANG

## 2019-08-28 NOTE — PROGRESS NOTES
[facility] Follow-up Visit    Marielos esophageal, gastric or colon cancer  - No family history of IBD.     Prior endoscopies:  March 2019 EGD performed by Dr. Marquita Guzman with IV Twilight r/t abdominal pain, findings: Erosive esophagitis, + H. pylori (treated with triple therapy)    August 2018 colono days. Disp: 56 capsule Rfl: 0   Pantoprazole Sodium 20 MG Oral Tab EC Take 1 tablet (20 mg total) by mouth 2 (two) times daily for 14 days. Disp: 28 tablet Rfl: 0   Mometasone Furoate 50 MCG/ACT Nasal Suspension 2 sprays by Nasal route daily.  In each nostr normal    Nursing note and vitals reviewed      Labs/Imaging:     Patient's labs and imaging were reviewed and discussed with patient today. See HPI and A&P for further details.      .  ASSESSMENT/PLAN:   Socorro Brambila is a 64year old year-old female, pat Imaging & Referrals:  None       ELMER Byrd  9/4/2019

## 2019-08-29 NOTE — TELEPHONE ENCOUNTER
Called and left pt a VM to inform them orthotics are ready to be picked up. When pt calls back please schedule an appt with  at [facility] [place] to do so. Thank You.

## 2019-09-04 ENCOUNTER — OFFICE VISIT (OUTPATIENT)
Dept: GASTROENTEROLOGY | Facility: CLINIC | Age: 59
End: 2019-09-04
Payer: COMMERCIAL

## 2019-09-04 VITALS
DIASTOLIC BLOOD PRESSURE: 69 MMHG | WEIGHT: 168.81 LBS | TEMPERATURE: 98 F | HEIGHT: 64 IN | RESPIRATION RATE: 18 BRPM | SYSTOLIC BLOOD PRESSURE: 102 MMHG | BODY MASS INDEX: 28.82 KG/M2 | HEART RATE: 64 BPM

## 2019-09-04 DIAGNOSIS — A04.8 H. PYLORI INFECTION: Primary | ICD-10-CM

## 2019-09-04 PROCEDURE — 99214 OFFICE O/P EST MOD 30 MIN: CPT | Performed by: NURSE PRACTITIONER

## 2019-09-04 RX ORDER — METRONIDAZOLE 250 MG/1
250 TABLET ORAL 4 TIMES DAILY
Qty: 56 TABLET | Refills: 0 | Status: SHIPPED | OUTPATIENT
Start: 2019-09-04 | End: 2019-09-18

## 2019-09-04 RX ORDER — PANTOPRAZOLE SODIUM 20 MG/1
20 TABLET, DELAYED RELEASE ORAL 2 TIMES DAILY
Qty: 28 TABLET | Refills: 0 | Status: SHIPPED | OUTPATIENT
Start: 2019-09-04 | End: 2019-09-18

## 2019-09-04 RX ORDER — TETRACYCLINE HYDROCHLORIDE 500 MG/1
500 CAPSULE ORAL 4 TIMES DAILY
Qty: 56 CAPSULE | Refills: 0 | Status: SHIPPED | OUTPATIENT
Start: 2019-09-04 | End: 2019-09-18

## 2019-09-04 NOTE — PATIENT INSTRUCTIONS
1.  Complete antibiotic treatment x2 weeks  2.   Repeat H. pylori stool test at the lab 6-8 weeks after the antibiotics are done

## 2019-09-19 ENCOUNTER — TELEPHONE (OUTPATIENT)
Dept: INTERNAL MEDICINE CLINIC | Facility: CLINIC | Age: 59
End: 2019-09-19

## 2019-09-20 NOTE — TELEPHONE ENCOUNTER
See OFV 09/04 Gastro, patient d/c'd medication     OMEPRAZOLE 40 MG Oral Capsule Delayed Release (Discontinued) 180 capsule 1 6/7/2019 9/4/2019   Sig:   TAKE ONE CAPSULE BY MOUTH TWICE DAILY 30 TO 60 MINUTES BEFORE BREAKFAST AND AT BEDTIME     Route:   (no

## 2019-10-11 ENCOUNTER — OFFICE VISIT (OUTPATIENT)
Dept: PODIATRY CLINIC | Facility: CLINIC | Age: 59
End: 2019-10-11
Payer: COMMERCIAL

## 2019-10-11 DIAGNOSIS — M72.2 PLANTAR FASCIITIS, BILATERAL: Primary | ICD-10-CM

## 2019-10-11 NOTE — PROGRESS NOTES
HPI:    Patient ID: Kymberly Yepez is a 64year old female. This 68-year-old female presents for dispensing of orthoses. They appear to be of proper fit. I do not anticipate areas of irritation.   Because she has been wearing a support I do not see reas

## 2019-12-04 NOTE — PROGRESS NOTES
[facility] Follow-up Visit    Marielos esophagitis, + H. pylori (treated with triple therapy)     August 2018 colonoscopy performed by Dr. Shanna Yates with IV Twilight related to colon cancer screening, rectal bleeding, findings: Unremarkable with the exception of hemorrhoids, 10-year recall      Soci breath  CARDIOVASCULAR:  negative for  chest pain  GASTROINTESTINAL:  see HPI  GENITOURINARY:  negative for dysuria and hematuria   SKIN:  negative for  rash  ALLERGIC/IMMUNOLOGIC:  negative for hay fever allergies  ENDOCRINE:  negative for cold intoleranc would try a trial of acid suppression such as Protonix 40 mg daily and reassess in office in the next 2-3 months. I doubt loss of smell/taste is related to H. pylori infection or antibiotics as her complaints predate antibiotic therapy.   She will follow-u

## 2019-12-10 ENCOUNTER — OFFICE VISIT (OUTPATIENT)
Dept: GASTROENTEROLOGY | Facility: CLINIC | Age: 59
End: 2019-12-10
Payer: COMMERCIAL

## 2019-12-10 VITALS
HEART RATE: 66 BPM | WEIGHT: 177 LBS | SYSTOLIC BLOOD PRESSURE: 112 MMHG | HEIGHT: 64 IN | BODY MASS INDEX: 30.22 KG/M2 | DIASTOLIC BLOOD PRESSURE: 73 MMHG

## 2019-12-10 DIAGNOSIS — A04.8 H. PYLORI INFECTION: Primary | ICD-10-CM

## 2019-12-10 PROCEDURE — 99214 OFFICE O/P EST MOD 30 MIN: CPT | Performed by: NURSE PRACTITIONER

## 2019-12-30 ENCOUNTER — APPOINTMENT (OUTPATIENT)
Dept: LAB | Age: 59
End: 2019-12-30
Attending: NURSE PRACTITIONER
Payer: COMMERCIAL

## 2019-12-30 PROCEDURE — 87338 HPYLORI STOOL AG IA: CPT | Performed by: NURSE PRACTITIONER

## 2020-01-02 DIAGNOSIS — A04.8 H. PYLORI INFECTION: Primary | ICD-10-CM

## 2020-01-02 RX ORDER — PANTOPRAZOLE SODIUM 40 MG/1
40 TABLET, DELAYED RELEASE ORAL 2 TIMES DAILY
Qty: 28 TABLET | Refills: 0 | Status: SHIPPED | OUTPATIENT
Start: 2020-01-02 | End: 2020-01-16

## 2020-01-02 RX ORDER — AMOXICILLIN 500 MG/1
1000 TABLET, FILM COATED ORAL 2 TIMES DAILY
Qty: 56 TABLET | Refills: 0 | Status: SHIPPED | OUTPATIENT
Start: 2020-01-02 | End: 2020-01-16

## 2020-01-02 RX ORDER — LEVOFLOXACIN 500 MG/1
500 TABLET, FILM COATED ORAL EVERY 24 HOURS
Qty: 14 TABLET | Refills: 0 | Status: SHIPPED | OUTPATIENT
Start: 2020-01-02 | End: 2020-01-16

## 2020-05-05 ENCOUNTER — TELEPHONE (OUTPATIENT)
Dept: GASTROENTEROLOGY | Facility: CLINIC | Age: 60
End: 2020-05-05

## 2020-08-01 ENCOUNTER — TELEPHONE (OUTPATIENT)
Dept: SCHEDULING | Age: 60
End: 2020-08-01

## 2020-08-05 ENCOUNTER — OFFICE VISIT (OUTPATIENT)
Dept: FAMILY MEDICINE | Age: 60
End: 2020-08-05

## 2020-08-05 VITALS
DIASTOLIC BLOOD PRESSURE: 84 MMHG | HEART RATE: 70 BPM | SYSTOLIC BLOOD PRESSURE: 124 MMHG | WEIGHT: 178.46 LBS | TEMPERATURE: 98.6 F | RESPIRATION RATE: 19 BRPM | BODY MASS INDEX: 30.47 KG/M2 | HEIGHT: 64 IN | OXYGEN SATURATION: 97 %

## 2020-08-05 DIAGNOSIS — R52 BODY ACHES: Primary | ICD-10-CM

## 2020-08-05 DIAGNOSIS — R53.83 FATIGUE, UNSPECIFIED TYPE: ICD-10-CM

## 2020-08-05 DIAGNOSIS — M25.50 PAIN IN JOINT, MULTIPLE SITES: ICD-10-CM

## 2020-08-05 PROCEDURE — 85025 COMPLETE CBC W/AUTO DIFF WBC: CPT | Performed by: FAMILY MEDICINE

## 2020-08-05 PROCEDURE — 86038 ANTINUCLEAR ANTIBODIES: CPT | Performed by: FAMILY MEDICINE

## 2020-08-05 PROCEDURE — 84443 ASSAY THYROID STIM HORMONE: CPT | Performed by: FAMILY MEDICINE

## 2020-08-05 PROCEDURE — 96127 BRIEF EMOTIONAL/BEHAV ASSMT: CPT | Performed by: FAMILY MEDICINE

## 2020-08-05 PROCEDURE — 85652 RBC SED RATE AUTOMATED: CPT | Performed by: FAMILY MEDICINE

## 2020-08-05 PROCEDURE — 84439 ASSAY OF FREE THYROXINE: CPT | Performed by: FAMILY MEDICINE

## 2020-08-05 PROCEDURE — 86431 RHEUMATOID FACTOR QUANT: CPT | Performed by: FAMILY MEDICINE

## 2020-08-05 PROCEDURE — 99204 OFFICE O/P NEW MOD 45 MIN: CPT | Performed by: FAMILY MEDICINE

## 2020-08-05 PROCEDURE — 82306 VITAMIN D 25 HYDROXY: CPT | Performed by: FAMILY MEDICINE

## 2020-08-05 PROCEDURE — 86140 C-REACTIVE PROTEIN: CPT | Performed by: FAMILY MEDICINE

## 2020-08-05 RX ORDER — MELOXICAM 7.5 MG/1
7.5 TABLET ORAL 2 TIMES DAILY
Qty: 60 TABLET | Refills: 0 | Status: SHIPPED | OUTPATIENT
Start: 2020-08-05 | End: 2020-08-12 | Stop reason: ALTCHOICE

## 2020-08-05 SDOH — HEALTH STABILITY: MENTAL HEALTH
STRESS IS WHEN SOMEONE FEELS TENSE, NERVOUS, ANXIOUS, OR CAN'T SLEEP AT NIGHT BECAUSE THEIR MIND IS TROUBLED. HOW STRESSED ARE YOU?: NOT AT ALL

## 2020-08-05 SDOH — HEALTH STABILITY: PHYSICAL HEALTH: ON AVERAGE, HOW MANY MINUTES DO YOU ENGAGE IN EXERCISE AT THIS LEVEL?: 0 MIN

## 2020-08-05 SDOH — HEALTH STABILITY: PHYSICAL HEALTH: ON AVERAGE, HOW MANY DAYS PER WEEK DO YOU ENGAGE IN MODERATE TO STRENUOUS EXERCISE (LIKE A BRISK WALK)?: 0 DAYS

## 2020-08-05 ASSESSMENT — PATIENT HEALTH QUESTIONNAIRE - PHQ9
2. FEELING DOWN, DEPRESSED OR HOPELESS: NOT AT ALL
SUM OF ALL RESPONSES TO PHQ9 QUESTIONS 1 AND 2: 0
CLINICAL INTERPRETATION OF PHQ9 SCORE: NO FURTHER SCREENING NEEDED
1. LITTLE INTEREST OR PLEASURE IN DOING THINGS: NOT AT ALL
CLINICAL INTERPRETATION OF PHQ2 SCORE: NO FURTHER SCREENING NEEDED
SUM OF ALL RESPONSES TO PHQ9 QUESTIONS 1 AND 2: 0

## 2020-08-06 ENCOUNTER — OFFICE VISIT (OUTPATIENT)
Dept: FAMILY MEDICINE | Age: 60
End: 2020-08-06

## 2020-08-06 ENCOUNTER — LAB SERVICES (OUTPATIENT)
Dept: FAMILY MEDICINE | Age: 60
End: 2020-08-06

## 2020-08-06 VITALS
SYSTOLIC BLOOD PRESSURE: 106 MMHG | RESPIRATION RATE: 18 BRPM | HEIGHT: 64 IN | BODY MASS INDEX: 30.39 KG/M2 | TEMPERATURE: 98 F | DIASTOLIC BLOOD PRESSURE: 74 MMHG | HEART RATE: 65 BPM | OXYGEN SATURATION: 98 % | WEIGHT: 178 LBS

## 2020-08-06 DIAGNOSIS — Z11.59 NEED FOR HEPATITIS C SCREENING TEST: ICD-10-CM

## 2020-08-06 DIAGNOSIS — Z00.00 ANNUAL PHYSICAL EXAM: Primary | ICD-10-CM

## 2020-08-06 LAB
25(OH)D3+25(OH)D2 SERPL-MCNC: 14 NG/ML (ref 30–100)
ANA SER QL IA: NEGATIVE
BASOPHILS # BLD: 0 K/MCL (ref 0–0.3)
BASOPHILS NFR BLD: 1 %
CRP SERPL-MCNC: 1.1 MG/DL
DIFFERENTIAL METHOD BLD: ABNORMAL
EOSINOPHIL # BLD: 0.1 K/MCL (ref 0.1–0.5)
EOSINOPHIL NFR BLD: 2 %
ERYTHROCYTE [DISTWIDTH] IN BLOOD: 13.6 % (ref 11–15)
ERYTHROCYTE [SEDIMENTATION RATE] IN BLOOD BY WESTERGREN METHOD: 30 MM/HR (ref 0–20)
HCT VFR BLD CALC: 41.7 % (ref 36–46.5)
HGB BLD-MCNC: 13.1 G/DL (ref 12–15.5)
IMM GRANULOCYTES # BLD AUTO: 0 K/MCL (ref 0–0.2)
IMM GRANULOCYTES NFR BLD: 0 %
LYMPHOCYTES # BLD: 1.9 K/MCL (ref 1–4)
LYMPHOCYTES NFR BLD: 32 %
MCH RBC QN AUTO: 27.8 PG (ref 26–34)
MCHC RBC AUTO-ENTMCNC: 31.4 G/DL (ref 32–36.5)
MCV RBC AUTO: 88.5 FL (ref 78–100)
MONOCYTES # BLD: 0.4 K/MCL (ref 0.3–0.9)
MONOCYTES NFR BLD: 6 %
NEUTROPHILS # BLD: 3.6 K/MCL (ref 1.8–7.7)
NEUTROPHILS NFR BLD: 59 %
NRBC BLD MANUAL-RTO: 0 /100 WBC
PLATELET # BLD: 290 K/MCL (ref 140–450)
RBC # BLD: 4.71 MIL/MCL (ref 4–5.2)
RHEUMATOID FACT SER NEPH-ACNC: <10 UNITS/ML
T4 FREE SERPL-MCNC: 0.8 NG/DL (ref 0.8–1.5)
TSH SERPL-ACNC: 1.51 MCUNITS/ML (ref 0.35–5)
WBC # BLD: 6 K/MCL (ref 4.2–11)

## 2020-08-06 PROCEDURE — X1094 NO CHARGE VISIT: HCPCS | Performed by: FAMILY MEDICINE

## 2020-08-06 PROCEDURE — 80053 COMPREHEN METABOLIC PANEL: CPT | Performed by: FAMILY MEDICINE

## 2020-08-06 PROCEDURE — 87521 HEPATITIS C PROBE&RVRS TRNSC: CPT | Performed by: FAMILY MEDICINE

## 2020-08-06 PROCEDURE — 83036 HEMOGLOBIN GLYCOSYLATED A1C: CPT | Performed by: FAMILY MEDICINE

## 2020-08-06 PROCEDURE — 86803 HEPATITIS C AB TEST: CPT | Performed by: FAMILY MEDICINE

## 2020-08-06 PROCEDURE — 80061 LIPID PANEL: CPT | Performed by: FAMILY MEDICINE

## 2020-08-06 PROCEDURE — 36415 COLL VENOUS BLD VENIPUNCTURE: CPT

## 2020-08-07 ENCOUNTER — TELEPHONE (OUTPATIENT)
Dept: OBGYN | Age: 60
End: 2020-08-07

## 2020-08-07 DIAGNOSIS — E55.9 VITAMIN D DEFICIENCY: Primary | ICD-10-CM

## 2020-08-07 LAB
ALBUMIN SERPL-MCNC: 3.7 G/DL (ref 3.6–5.1)
ALBUMIN/GLOB SERPL: 1 {RATIO} (ref 1–2.4)
ALP SERPL-CCNC: 96 UNITS/L (ref 45–117)
ALT SERPL-CCNC: 24 UNITS/L
ANION GAP SERPL CALC-SCNC: 10 MMOL/L (ref 10–20)
AST SERPL-CCNC: 19 UNITS/L
BILIRUB SERPL-MCNC: 0.7 MG/DL (ref 0.2–1)
BUN SERPL-MCNC: 18 MG/DL (ref 6–20)
BUN/CREAT SERPL: 40 (ref 7–25)
CALCIUM SERPL-MCNC: 9.3 MG/DL (ref 8.4–10.2)
CHLORIDE SERPL-SCNC: 109 MMOL/L (ref 98–107)
CHOLEST SERPL-MCNC: 197 MG/DL
CHOLEST/HDLC SERPL: 3.8 {RATIO}
CO2 SERPL-SCNC: 24 MMOL/L (ref 21–32)
CREAT SERPL-MCNC: 0.45 MG/DL (ref 0.51–0.95)
GLOBULIN SER-MCNC: 3.6 G/DL (ref 2–4)
GLUCOSE SERPL-MCNC: 122 MG/DL (ref 65–99)
HBA1C MFR BLD: 6.5 % (ref 4.5–5.6)
HCV AB SER QL: POSITIVE
HCV RNA SERPL NAA+PROBE-ACNC: NOT DETECTED K[IU]/ML
HCV RNA SERPL QL NAA+PROBE: NOT DETECTED
HDLC SERPL-MCNC: 52 MG/DL
LDLC SERPL CALC-MCNC: 120 MG/DL
LENGTH OF FAST TIME PATIENT: ABNORMAL HRS
LENGTH OF FAST TIME PATIENT: NORMAL HRS
NONHDLC SERPL-MCNC: 145 MG/DL
POTASSIUM SERPL-SCNC: 4.2 MMOL/L (ref 3.4–5.1)
PROT SERPL-MCNC: 7.3 G/DL (ref 6.4–8.2)
SODIUM SERPL-SCNC: 139 MMOL/L (ref 135–145)
TRIGL SERPL-MCNC: 124 MG/DL

## 2020-08-12 ENCOUNTER — OFFICE VISIT (OUTPATIENT)
Dept: FAMILY MEDICINE | Age: 60
End: 2020-08-12

## 2020-08-12 VITALS
RESPIRATION RATE: 18 BRPM | DIASTOLIC BLOOD PRESSURE: 81 MMHG | SYSTOLIC BLOOD PRESSURE: 125 MMHG | WEIGHT: 179.9 LBS | HEART RATE: 68 BPM | TEMPERATURE: 98.6 F | OXYGEN SATURATION: 97 % | BODY MASS INDEX: 30.71 KG/M2 | HEIGHT: 64 IN

## 2020-08-12 DIAGNOSIS — M25.50 PAIN IN JOINT, MULTIPLE SITES: ICD-10-CM

## 2020-08-12 DIAGNOSIS — Z00.00 ANNUAL PHYSICAL EXAM: Primary | ICD-10-CM

## 2020-08-12 DIAGNOSIS — M79.89 MASS OF SOFT TISSUE OF RIGHT UPPER EXTREMITY: ICD-10-CM

## 2020-08-12 DIAGNOSIS — Z12.11 SCREENING FOR COLON CANCER: ICD-10-CM

## 2020-08-12 DIAGNOSIS — Z12.4 SCREENING FOR CERVICAL CANCER: ICD-10-CM

## 2020-08-12 DIAGNOSIS — Z12.39 SCREENING FOR MALIGNANT NEOPLASM OF BREAST: ICD-10-CM

## 2020-08-12 DIAGNOSIS — G47.9 SLEEP DISTURBANCE: ICD-10-CM

## 2020-08-12 DIAGNOSIS — R53.83 FATIGUE, UNSPECIFIED TYPE: ICD-10-CM

## 2020-08-12 DIAGNOSIS — E11.9 TYPE 2 DIABETES MELLITUS WITHOUT COMPLICATION, WITHOUT LONG-TERM CURRENT USE OF INSULIN (CMD): ICD-10-CM

## 2020-08-12 PROBLEM — R73.03 PREDIABETES: Status: ACTIVE | Noted: 2020-08-12

## 2020-08-12 PROCEDURE — 82043 UR ALBUMIN QUANTITATIVE: CPT | Performed by: FAMILY MEDICINE

## 2020-08-12 PROCEDURE — 3079F DIAST BP 80-89 MM HG: CPT | Performed by: FAMILY MEDICINE

## 2020-08-12 PROCEDURE — 99396 PREV VISIT EST AGE 40-64: CPT | Performed by: FAMILY MEDICINE

## 2020-08-12 PROCEDURE — 99213 OFFICE O/P EST LOW 20 MIN: CPT | Performed by: FAMILY MEDICINE

## 2020-08-12 PROCEDURE — 3074F SYST BP LT 130 MM HG: CPT | Performed by: FAMILY MEDICINE

## 2020-08-12 RX ORDER — ERGOCALCIFEROL 1.25 MG/1
CAPSULE ORAL
COMMUNITY
Start: 2020-08-11 | End: 2021-07-26 | Stop reason: ALTCHOICE

## 2020-08-12 RX ORDER — ALBUTEROL SULFATE 90 UG/1
AEROSOL, METERED RESPIRATORY (INHALATION)
COMMUNITY
Start: 2020-08-07 | End: 2021-07-26 | Stop reason: ALTCHOICE

## 2020-08-12 RX ORDER — MELOXICAM 15 MG/1
TABLET ORAL
COMMUNITY
Start: 2020-08-05 | End: 2020-08-12 | Stop reason: SDUPTHER

## 2020-08-12 RX ORDER — NAPROXEN 500 MG/1
500 TABLET ORAL 2 TIMES DAILY WITH MEALS
Qty: 60 TABLET | Refills: 2 | Status: SHIPPED | OUTPATIENT
Start: 2020-08-12 | End: 2021-07-26 | Stop reason: ALTCHOICE

## 2020-08-12 RX ORDER — NAPROXEN 500 MG/1
TABLET ORAL
COMMUNITY
Start: 2020-05-08 | End: 2020-08-12 | Stop reason: SDUPTHER

## 2020-08-12 SDOH — SOCIAL STABILITY: SOCIAL NETWORK: HOW OFTEN DO YOU ATTENT MEETINGS OF THE CLUB OR ORGANIZATION YOU BELONG TO?: NEVER

## 2020-08-12 SDOH — SOCIAL STABILITY: SOCIAL NETWORK: IN A TYPICAL WEEK, HOW MANY TIMES DO YOU TALK ON THE PHONE WITH FAMILY, FRIENDS, OR NEIGHBORS?: TWICE A WEEK

## 2020-08-12 SDOH — SOCIAL STABILITY: SOCIAL INSECURITY: WITHIN THE LAST YEAR, HAVE YOU BEEN HUMILIATED OR EMOTIONALLY ABUSED IN OTHER WAYS BY YOUR PARTNER OR EX-PARTNER?: NO

## 2020-08-12 SDOH — ECONOMIC STABILITY: TRANSPORTATION INSECURITY
IN THE PAST 12 MONTHS, HAS LACK OF TRANSPORTATION KEPT YOU FROM MEETINGS, WORK, OR FROM GETTING THINGS NEEDED FOR DAILY LIVING?: NO

## 2020-08-12 SDOH — ECONOMIC STABILITY: FOOD INSECURITY: WITHIN THE PAST 12 MONTHS, YOU WORRIED THAT YOUR FOOD WOULD RUN OUT BEFORE YOU GOT MONEY TO BUY MORE.: NEVER TRUE

## 2020-08-12 SDOH — SOCIAL STABILITY: SOCIAL INSECURITY
WITHIN THE LAST YEAR, HAVE YOU BEEN KICKED, HIT, SLAPPED, OR OTHERWISE PHYSICALLY HURT BY YOUR PARTNER OR EX-PARTNER?: NO

## 2020-08-12 SDOH — HEALTH STABILITY: MENTAL HEALTH: HOW OFTEN DO YOU HAVE A DRINK CONTAINING ALCOHOL?: NEVER

## 2020-08-12 SDOH — ECONOMIC STABILITY: TRANSPORTATION INSECURITY
IN THE PAST 12 MONTHS, HAS THE LACK OF TRANSPORTATION KEPT YOU FROM MEDICAL APPOINTMENTS OR FROM GETTING MEDICATIONS?: NO

## 2020-08-12 SDOH — SOCIAL STABILITY: SOCIAL NETWORK: HOW OFTEN DO YOU ATTEND CHURCH OR RELIGIOUS SERVICES?: MORE THAN 4 TIMES PER YEAR

## 2020-08-12 SDOH — SOCIAL STABILITY: SOCIAL NETWORK
DO YOU BELONG TO ANY CLUBS OR ORGANIZATIONS SUCH AS CHURCH GROUPS UNIONS, FRATERNAL OR ATHLETIC GROUPS, OR SCHOOL GROUPS?: NO

## 2020-08-12 SDOH — SOCIAL STABILITY: SOCIAL INSECURITY: WITHIN THE LAST YEAR, HAVE YOU BEEN AFRAID OF YOUR PARTNER OR EX-PARTNER?: NO

## 2020-08-12 SDOH — SOCIAL STABILITY: SOCIAL INSECURITY
WITHIN THE LAST YEAR, HAVE TO BEEN RAPED OR FORCED TO HAVE ANY KIND OF SEXUAL ACTIVITY BY YOUR PARTNER OR EX-PARTNER?: NO

## 2020-08-12 SDOH — SOCIAL STABILITY: SOCIAL NETWORK: ARE YOU MARRIED, WIDOWED, DIVORCED, SEPARATED, NEVER MARRIED, OR LIVING WITH A PARTNER?: MARRIED

## 2020-08-12 SDOH — ECONOMIC STABILITY: FOOD INSECURITY: WITHIN THE PAST 12 MONTHS, THE FOOD YOU BOUGHT JUST DIDN'T LAST AND YOU DIDN'T HAVE MONEY TO GET MORE.: NEVER TRUE

## 2020-08-12 ASSESSMENT — ENCOUNTER SYMPTOMS
BLOOD IN STOOL: 0
WEAKNESS: 0
ACTIVITY CHANGE: 1
BRUISES/BLEEDS EASILY: 0
POLYDIPSIA: 0
VOMITING: 0
POLYPHAGIA: 0
CHEST TIGHTNESS: 0
FEVER: 0
SORE THROAT: 0
SINUS PAIN: 0
FATIGUE: 1
NUMBNESS: 1
SEIZURES: 0
TREMORS: 0
HEADACHES: 1
EYE ITCHING: 0
BACK PAIN: 0
WHEEZING: 0
NAUSEA: 0
SHORTNESS OF BREATH: 0
SINUS PRESSURE: 0
EYE REDNESS: 0
CONSTIPATION: 0
COUGH: 0
ABDOMINAL PAIN: 0
EYE PAIN: 0
LIGHT-HEADEDNESS: 1
EYE DISCHARGE: 0
SLEEP DISTURBANCE: 1
DIARRHEA: 0
APNEA: 0
DIZZINESS: 1
APPETITE CHANGE: 0

## 2020-08-13 LAB
CREAT UR-MCNC: 199 MG/DL
MICROALBUMIN UR-MCNC: 1.35 MG/DL
MICROALBUMIN/CREAT UR: 6.8 MG/G

## 2020-09-08 ENCOUNTER — OFFICE VISIT (OUTPATIENT)
Dept: FAMILY MEDICINE | Age: 60
End: 2020-09-08

## 2020-09-08 VITALS
HEIGHT: 64 IN | TEMPERATURE: 98.9 F | DIASTOLIC BLOOD PRESSURE: 74 MMHG | BODY MASS INDEX: 30.24 KG/M2 | HEART RATE: 72 BPM | RESPIRATION RATE: 18 BRPM | SYSTOLIC BLOOD PRESSURE: 103 MMHG | WEIGHT: 177.14 LBS | OXYGEN SATURATION: 97 %

## 2020-09-08 DIAGNOSIS — R42 DIZZINESS OF UNKNOWN CAUSE: Primary | ICD-10-CM

## 2020-09-08 PROCEDURE — 99213 OFFICE O/P EST LOW 20 MIN: CPT | Performed by: FAMILY MEDICINE

## 2020-09-08 ASSESSMENT — ENCOUNTER SYMPTOMS
EYE REDNESS: 0
SINUS PRESSURE: 1
FEVER: 0
EYE DISCHARGE: 1
BRUISES/BLEEDS EASILY: 0
SINUS PAIN: 1
SHORTNESS OF BREATH: 0
RHINORRHEA: 0
DIARRHEA: 0
VOMITING: 0
NAUSEA: 0
EYE ITCHING: 1
APPETITE CHANGE: 1
LIGHT-HEADEDNESS: 1
SORE THROAT: 0
HEADACHES: 1
ALLERGIC/IMMUNOLOGIC COMMENTS: NKDA
PSYCHIATRIC NEGATIVE: 1
ABDOMINAL PAIN: 0

## 2020-09-29 ENCOUNTER — TELEPHONE (OUTPATIENT)
Dept: SLEEP MEDICINE | Age: 60
End: 2020-09-29

## 2020-09-29 DIAGNOSIS — G47.9 SLEEP DISTURBANCE: Primary | ICD-10-CM

## 2020-09-29 DIAGNOSIS — R53.83 FATIGUE, UNSPECIFIED TYPE: ICD-10-CM

## 2020-10-02 ENCOUNTER — HOSPITAL ENCOUNTER (OUTPATIENT)
Age: 60
Discharge: HOME | End: 2020-10-02
Payer: COMMERCIAL

## 2020-10-02 ENCOUNTER — APPOINTMENT (OUTPATIENT)
Dept: GENERAL RADIOLOGY | Age: 60
End: 2020-10-02
Attending: NURSE PRACTITIONER
Payer: COMMERCIAL

## 2020-10-02 VITALS
HEART RATE: 67 BPM | OXYGEN SATURATION: 96 % | SYSTOLIC BLOOD PRESSURE: 120 MMHG | DIASTOLIC BLOOD PRESSURE: 77 MMHG | TEMPERATURE: 98 F | RESPIRATION RATE: 18 BRPM

## 2020-10-02 DIAGNOSIS — J02.9 VIRAL PHARYNGITIS: Primary | ICD-10-CM

## 2020-10-02 DIAGNOSIS — Z20.822 ENCOUNTER FOR SCREENING LABORATORY TESTING FOR COVID-19 VIRUS: ICD-10-CM

## 2020-10-02 PROCEDURE — 99213 OFFICE O/P EST LOW 20 MIN: CPT

## 2020-10-02 PROCEDURE — 87430 STREP A AG IA: CPT

## 2020-10-02 NOTE — ED PROVIDER NOTES
Patient Seen in: [facility]      History   Patient presents with:  Sore Throat    Stated Complaint: sore throat    HPI    This is a 16-year-old female presenting with sore throat and slight cough that started today.   Marielos Resp 18   Temp 98.3 °F (36.8 °C)   Temp src Oral   SpO2 96 %   O2 Device None (Room air)       Current:/77   Pulse 67   Temp 98.3 °F (36.8 °C) (Oral)   Resp 18   SpO2 96%         Physical Exam  Vitals signs and nursing note reviewed.    Constitution explained rationale and CDC recommendations. Discussed any new or worsening or severe symptoms to go to the ER to be evaluated. All questions and concerns answered for the patient and family they verbalized understanding discharge instructions.

## 2020-10-02 NOTE — ED INITIAL ASSESSMENT (HPI)
PATIENT ARRIVED AMBULATORY TO ROOM WITH SON C/O SYMPTOMS THAT STARTED YESTERDAY. +SORE THROAT. SLIGHT COUGH. NO FEVERS. NO N/V/D. EASY NON LABORED RESPIRATIONS.

## 2020-10-05 NOTE — ED NOTES
Spoke to Fiordaliza regarding positive covid test. Pt reports a continued sore throat. Instructed her to take tylenol or motrin for any continuing pain and to stay hydrated and get rest. Verbalized understanding that antibiotics are not effective against COVID.

## 2020-10-06 ENCOUNTER — APPOINTMENT (OUTPATIENT)
Dept: ULTRASOUND IMAGING | Age: 60
End: 2020-10-06
Attending: FAMILY MEDICINE

## 2020-10-08 ENCOUNTER — TELEPHONE (OUTPATIENT)
Dept: SLEEP MEDICINE | Age: 60
End: 2020-10-08

## 2020-10-09 ENCOUNTER — TELEPHONE (OUTPATIENT)
Dept: FAMILY MEDICINE | Age: 60
End: 2020-10-09

## 2020-10-12 ENCOUNTER — TELEPHONIC VISIT (OUTPATIENT)
Dept: FAMILY MEDICINE | Age: 60
End: 2020-10-12

## 2020-10-12 DIAGNOSIS — U07.1 COVID-19 VIRUS INFECTION: Primary | ICD-10-CM

## 2020-10-12 PROCEDURE — 99442 TELEPHONE E&M BY PHYSICIAN EST PT NOT ORIG PREV 7 DAYS 11-20 MIN: CPT | Performed by: FAMILY MEDICINE

## 2020-10-13 ENCOUNTER — TELEPHONE (OUTPATIENT)
Dept: SCHEDULING | Age: 60
End: 2020-10-13

## 2020-10-13 ASSESSMENT — ENCOUNTER SYMPTOMS
SINUS PRESSURE: 0
SINUS PAIN: 0
DIAPHORESIS: 0
ABDOMINAL PAIN: 0
APPETITE CHANGE: 0
NAUSEA: 0
DIARRHEA: 0
FEVER: 0
VOMITING: 0
SHORTNESS OF BREATH: 0
SORE THROAT: 0
CHILLS: 0
ACTIVITY CHANGE: 0
FATIGUE: 1
HEADACHES: 1
CHEST TIGHTNESS: 0
RHINORRHEA: 1
PSYCHIATRIC NEGATIVE: 1
COUGH: 1

## 2020-10-19 ENCOUNTER — TELEPHONE (OUTPATIENT)
Dept: SCHEDULING | Age: 60
End: 2020-10-19

## 2020-10-31 ENCOUNTER — HOSPITAL ENCOUNTER (OUTPATIENT)
Dept: ULTRASOUND IMAGING | Age: 60
Discharge: HOME | End: 2020-10-31
Attending: FAMILY MEDICINE

## 2020-10-31 DIAGNOSIS — M79.89 MASS OF SOFT TISSUE OF RIGHT UPPER EXTREMITY: ICD-10-CM

## 2020-10-31 PROCEDURE — 76882 US LMTD JT/FCL EVL NVASC XTR: CPT

## 2020-11-12 ENCOUNTER — TELEPHONE (OUTPATIENT)
Dept: FAMILY MEDICINE | Age: 60
End: 2020-11-12

## 2020-11-18 ENCOUNTER — TELEPHONIC VISIT (OUTPATIENT)
Dept: FAMILY MEDICINE | Age: 60
End: 2020-11-18

## 2020-11-18 DIAGNOSIS — G89.29 CHRONIC PAIN OF BOTH KNEES: ICD-10-CM

## 2020-11-18 DIAGNOSIS — M79.7 FIBROMYALGIA: ICD-10-CM

## 2020-11-18 DIAGNOSIS — M25.562 CHRONIC PAIN OF BOTH KNEES: ICD-10-CM

## 2020-11-18 DIAGNOSIS — M25.561 CHRONIC PAIN OF BOTH KNEES: ICD-10-CM

## 2020-11-18 DIAGNOSIS — M79.89 MASS OF SOFT TISSUE OF RIGHT UPPER EXTREMITY: Primary | ICD-10-CM

## 2020-11-18 DIAGNOSIS — G89.29 CHRONIC RIGHT SHOULDER PAIN: ICD-10-CM

## 2020-11-18 DIAGNOSIS — M25.511 CHRONIC RIGHT SHOULDER PAIN: ICD-10-CM

## 2020-11-18 PROBLEM — K29.60 EROSIVE GASTRITIS: Status: ACTIVE | Noted: 2019-03-07

## 2020-11-18 PROBLEM — J45.21 MILD INTERMITTENT ASTHMA WITH EXACERBATION: Status: ACTIVE | Noted: 2018-05-09

## 2020-11-18 PROBLEM — J45.21 MILD INTERMITTENT ASTHMA WITH EXACERBATION: Status: RESOLVED | Noted: 2018-05-09 | Resolved: 2020-11-18

## 2020-11-18 PROBLEM — K64.8 INTERNAL HEMORRHOIDS WITHOUT COMPLICATION: Status: ACTIVE | Noted: 2018-08-10

## 2020-11-18 PROCEDURE — 99442 TELEPHONE E&M BY PHYSICIAN EST PT NOT ORIG PREV 7 DAYS 11-20 MIN: CPT | Performed by: FAMILY MEDICINE

## 2020-11-18 ASSESSMENT — ENCOUNTER SYMPTOMS
ACTIVITY CHANGE: 1
SINUS PAIN: 0
DIARRHEA: 0
SHORTNESS OF BREATH: 0
NAUSEA: 0
SINUS PRESSURE: 0
FATIGUE: 1
HEADACHES: 0
ALLERGIC/IMMUNOLOGIC COMMENTS: NKDA
APPETITE CHANGE: 0
ABDOMINAL PAIN: 0
UNEXPECTED WEIGHT CHANGE: 0
VOMITING: 0
PSYCHIATRIC NEGATIVE: 1
BRUISES/BLEEDS EASILY: 0

## 2021-01-01 ENCOUNTER — EXTERNAL RECORD (OUTPATIENT)
Dept: HEALTH INFORMATION MANAGEMENT | Facility: OTHER | Age: 61
End: 2021-01-01

## 2021-01-13 ENCOUNTER — APPOINTMENT (OUTPATIENT)
Dept: FAMILY MEDICINE | Age: 61
End: 2021-01-13

## 2021-01-19 ENCOUNTER — OFFICE VISIT (OUTPATIENT)
Dept: FAMILY MEDICINE | Age: 61
End: 2021-01-19

## 2021-01-19 VITALS
DIASTOLIC BLOOD PRESSURE: 84 MMHG | OXYGEN SATURATION: 98 % | HEART RATE: 69 BPM | WEIGHT: 174.94 LBS | SYSTOLIC BLOOD PRESSURE: 118 MMHG | HEIGHT: 64 IN | BODY MASS INDEX: 29.87 KG/M2 | RESPIRATION RATE: 18 BRPM

## 2021-01-19 DIAGNOSIS — J01.40 ACUTE PANSINUSITIS, RECURRENCE NOT SPECIFIED: ICD-10-CM

## 2021-01-19 DIAGNOSIS — G89.29 CHRONIC INTRACTABLE HEADACHE, UNSPECIFIED HEADACHE TYPE: ICD-10-CM

## 2021-01-19 DIAGNOSIS — H91.90 HEARING DIFFICULTY, UNSPECIFIED LATERALITY: ICD-10-CM

## 2021-01-19 DIAGNOSIS — R55 NEAR SYNCOPE: ICD-10-CM

## 2021-01-19 DIAGNOSIS — R51.9 CHRONIC INTRACTABLE HEADACHE, UNSPECIFIED HEADACHE TYPE: ICD-10-CM

## 2021-01-19 DIAGNOSIS — R42 DIZZINESS: Primary | ICD-10-CM

## 2021-01-19 PROBLEM — Z86.16 HISTORY OF 2019 NOVEL CORONAVIRUS DISEASE (COVID-19): Status: ACTIVE | Noted: 2021-01-19

## 2021-01-19 PROCEDURE — 99214 OFFICE O/P EST MOD 30 MIN: CPT | Performed by: FAMILY MEDICINE

## 2021-01-19 RX ORDER — FLUTICASONE PROPIONATE 50 MCG
1 SPRAY, SUSPENSION (ML) NASAL 2 TIMES DAILY
Qty: 16 G | Refills: 2 | Status: SHIPPED | OUTPATIENT
Start: 2021-01-19 | End: 2022-11-08

## 2021-01-19 ASSESSMENT — ENCOUNTER SYMPTOMS
NAUSEA: 0
PHOTOPHOBIA: 1
PSYCHIATRIC NEGATIVE: 1
SHORTNESS OF BREATH: 0
ALLERGIC/IMMUNOLOGIC COMMENTS: NKDA
VOMITING: 0
FATIGUE: 0
HEADACHES: 1
ABDOMINAL PAIN: 0
DIARRHEA: 0
SINUS PAIN: 0
BRUISES/BLEEDS EASILY: 0
ACTIVITY CHANGE: 1
NUMBNESS: 1
SINUS PRESSURE: 0

## 2021-01-28 ENCOUNTER — TELEPHONE (OUTPATIENT)
Dept: FAMILY MEDICINE | Age: 61
End: 2021-01-28

## 2021-04-08 ENCOUNTER — TELEPHONE (OUTPATIENT)
Dept: SCHEDULING | Age: 61
End: 2021-04-08

## 2021-04-08 DIAGNOSIS — E55.9 VITAMIN D DEFICIENCY: ICD-10-CM

## 2021-04-08 DIAGNOSIS — E11.65 TYPE 2 DIABETES MELLITUS WITH HYPERGLYCEMIA, WITHOUT LONG-TERM CURRENT USE OF INSULIN (CMD): Primary | ICD-10-CM

## 2021-04-13 ENCOUNTER — APPOINTMENT (OUTPATIENT)
Dept: FAMILY MEDICINE | Age: 61
End: 2021-04-13

## 2021-04-21 ENCOUNTER — APPOINTMENT (OUTPATIENT)
Dept: FAMILY MEDICINE | Age: 61
End: 2021-04-21

## 2021-04-22 ENCOUNTER — TELEPHONE (OUTPATIENT)
Dept: SCHEDULING | Age: 61
End: 2021-04-22

## 2021-04-28 ENCOUNTER — OFFICE VISIT (OUTPATIENT)
Dept: FAMILY MEDICINE | Age: 61
End: 2021-04-28

## 2021-04-28 VITALS
DIASTOLIC BLOOD PRESSURE: 88 MMHG | SYSTOLIC BLOOD PRESSURE: 132 MMHG | HEIGHT: 64 IN | BODY MASS INDEX: 28.6 KG/M2 | WEIGHT: 167.55 LBS | RESPIRATION RATE: 16 BRPM | TEMPERATURE: 98.2 F | OXYGEN SATURATION: 98 % | HEART RATE: 62 BPM

## 2021-04-28 DIAGNOSIS — E78.00 PURE HYPERCHOLESTEROLEMIA: ICD-10-CM

## 2021-04-28 DIAGNOSIS — R73.03 PREDIABETES: Primary | ICD-10-CM

## 2021-04-28 DIAGNOSIS — J30.2 SEASONAL ALLERGIES: ICD-10-CM

## 2021-04-28 DIAGNOSIS — E55.9 VITAMIN D DEFICIENCY: ICD-10-CM

## 2021-04-28 PROCEDURE — 99214 OFFICE O/P EST MOD 30 MIN: CPT | Performed by: FAMILY MEDICINE

## 2021-04-28 ASSESSMENT — ENCOUNTER SYMPTOMS
SINUS PAIN: 0
SHORTNESS OF BREATH: 0
ABDOMINAL PAIN: 0
DIARRHEA: 0
FEVER: 0
PSYCHIATRIC NEGATIVE: 1
ALLERGIC/IMMUNOLOGIC COMMENTS: NKDA
BRUISES/BLEEDS EASILY: 0
ACTIVITY CHANGE: 0
VOMITING: 0
NAUSEA: 0
SINUS PRESSURE: 0
HEADACHES: 0

## 2021-04-28 ASSESSMENT — PATIENT HEALTH QUESTIONNAIRE - PHQ9
SUM OF ALL RESPONSES TO PHQ9 QUESTIONS 1 AND 2: 0
1. LITTLE INTEREST OR PLEASURE IN DOING THINGS: NOT AT ALL
SUM OF ALL RESPONSES TO PHQ9 QUESTIONS 1 AND 2: 0
CLINICAL INTERPRETATION OF PHQ2 SCORE: NO FURTHER SCREENING NEEDED
2. FEELING DOWN, DEPRESSED OR HOPELESS: NOT AT ALL
CLINICAL INTERPRETATION OF PHQ9 SCORE: NO FURTHER SCREENING NEEDED

## 2021-06-09 LAB
25(OH)D3 SERPL-MCNC: 24 NG/ML (ref 30–100)
ALBUMIN SERPL-MCNC: 4.3 G/DL (ref 3.6–5.1)
ALBUMIN/GLOB SERPL: 1.4 (CALC) (ref 1–2.5)
ALP SERPL-CCNC: 80 U/L (ref 37–153)
ALT SERPL-CCNC: 15 U/L (ref 6–29)
AST SERPL-CCNC: 18 U/L (ref 10–35)
BASOPHILS # BLD AUTO: 42 CELLS/UL (ref 0–200)
BASOPHILS NFR BLD AUTO: 0.7 %
BILIRUB SERPL-MCNC: 0.6 MG/DL (ref 0.2–1.2)
BUN SERPL-MCNC: 11 MG/DL (ref 7–25)
BUN/CREAT SERPL: ABNORMAL (CALC) (ref 6–22)
CALCIUM SERPL-MCNC: 9.8 MG/DL (ref 8.6–10.4)
CHLORIDE SERPL-SCNC: 104 MMOL/L (ref 98–110)
CHOLEST SERPL-MCNC: 212 MG/DL
CHOLEST/HDLC SERPL: 4.4 (CALC)
CO2 SERPL-SCNC: 28 MMOL/L (ref 20–32)
CREAT SERPL-MCNC: 0.69 MG/DL (ref 0.5–1.05)
EOSINOPHIL # BLD AUTO: 138 CELLS/UL (ref 15–500)
EOSINOPHIL NFR BLD AUTO: 2.3 %
ERYTHROCYTE [DISTWIDTH] IN BLOOD BY AUTOMATED COUNT: 12.7 % (ref 11–15)
GLOBULIN SER CALC-MCNC: 3 G/DL (CALC) (ref 1.9–3.7)
GLUCOSE SERPL-MCNC: 109 MG/DL (ref 65–99)
HBA1C MFR BLD: 6.2 % OF TOTAL HGB
HCT VFR BLD AUTO: 39.1 % (ref 35–45)
HDLC SERPL-MCNC: 48 MG/DL
HGB BLD-MCNC: 12.7 G/DL (ref 11.7–15.5)
LDLC SERPL CALC-MCNC: 135 MG/DL (CALC)
LYMPHOCYTES # BLD AUTO: 2280 CELLS/UL (ref 850–3900)
LYMPHOCYTES NFR BLD AUTO: 38 %
MCH RBC QN AUTO: 27.9 PG (ref 27–33)
MCHC RBC AUTO-ENTMCNC: 32.5 G/DL (ref 32–36)
MCV RBC AUTO: 85.7 FL (ref 80–100)
MONOCYTES # BLD AUTO: 432 CELLS/UL (ref 200–950)
MONOCYTES NFR BLD AUTO: 7.2 %
NEUTROPHILS # BLD AUTO: 3108 CELLS/UL (ref 1500–7800)
NEUTROPHILS NFR BLD AUTO: 51.8 %
NONHDLC SERPL-MCNC: 164 MG/DL (CALC)
PLATELET # BLD AUTO: 275 THOUSAND/UL (ref 140–400)
PMV BLD REES-ECKER: 10.8 FL (ref 7.5–12.5)
POTASSIUM SERPL-SCNC: 4.5 MMOL/L (ref 3.5–5.3)
PROT SERPL-MCNC: 7.3 G/DL (ref 6.1–8.1)
RBC # BLD AUTO: 4.56 MILLION/UL (ref 3.8–5.1)
SODIUM SERPL-SCNC: 139 MMOL/L (ref 135–146)
TRIGL SERPL-MCNC: 155 MG/DL
WBC # BLD AUTO: 6 THOUSAND/UL (ref 3.8–10.8)

## 2021-06-15 ENCOUNTER — TELEPHONE (OUTPATIENT)
Dept: FAMILY MEDICINE | Age: 61
End: 2021-06-15

## 2021-06-17 ENCOUNTER — OFFICE VISIT (OUTPATIENT)
Dept: FAMILY MEDICINE | Age: 61
End: 2021-06-17

## 2021-06-17 VITALS
OXYGEN SATURATION: 98 % | DIASTOLIC BLOOD PRESSURE: 76 MMHG | HEIGHT: 64 IN | BODY MASS INDEX: 29.73 KG/M2 | WEIGHT: 174.16 LBS | SYSTOLIC BLOOD PRESSURE: 127 MMHG | TEMPERATURE: 99 F | HEART RATE: 72 BPM | RESPIRATION RATE: 16 BRPM

## 2021-06-17 DIAGNOSIS — E55.9 VITAMIN D DEFICIENCY: ICD-10-CM

## 2021-06-17 DIAGNOSIS — E11.9 TYPE 2 DIABETES MELLITUS WITHOUT COMPLICATION, WITHOUT LONG-TERM CURRENT USE OF INSULIN (CMD): Primary | ICD-10-CM

## 2021-06-17 DIAGNOSIS — E78.2 HYPERLIPIDEMIA, MIXED: ICD-10-CM

## 2021-06-17 PROCEDURE — 3074F SYST BP LT 130 MM HG: CPT | Performed by: FAMILY MEDICINE

## 2021-06-17 PROCEDURE — 3078F DIAST BP <80 MM HG: CPT | Performed by: FAMILY MEDICINE

## 2021-06-17 PROCEDURE — 99213 OFFICE O/P EST LOW 20 MIN: CPT | Performed by: FAMILY MEDICINE

## 2021-06-17 ASSESSMENT — PATIENT HEALTH QUESTIONNAIRE - PHQ9
SUM OF ALL RESPONSES TO PHQ9 QUESTIONS 1 AND 2: 0
CLINICAL INTERPRETATION OF PHQ2 SCORE: NO FURTHER SCREENING NEEDED
SUM OF ALL RESPONSES TO PHQ9 QUESTIONS 1 AND 2: 0
2. FEELING DOWN, DEPRESSED OR HOPELESS: NOT AT ALL
CLINICAL INTERPRETATION OF PHQ9 SCORE: NO FURTHER SCREENING NEEDED
1. LITTLE INTEREST OR PLEASURE IN DOING THINGS: NOT AT ALL

## 2021-06-17 ASSESSMENT — PAIN SCALES - GENERAL: PAINLEVEL: 6

## 2021-06-17 ASSESSMENT — ENCOUNTER SYMPTOMS
SINUS PRESSURE: 1
VOMITING: 0
APPETITE CHANGE: 1
SHORTNESS OF BREATH: 0
ABDOMINAL PAIN: 0
FEVER: 0
HEADACHES: 1
DIARRHEA: 1
BRUISES/BLEEDS EASILY: 0
SINUS PAIN: 1
NAUSEA: 0
ACTIVITY CHANGE: 0
ALLERGIC/IMMUNOLOGIC COMMENTS: NKDA

## 2021-07-26 ENCOUNTER — HOSPITAL ENCOUNTER (OUTPATIENT)
Age: 61
Discharge: HOME | End: 2021-07-26
Attending: EMERGENCY MEDICINE
Payer: COMMERCIAL

## 2021-07-26 ENCOUNTER — V-VISIT (OUTPATIENT)
Dept: FAMILY MEDICINE | Age: 61
End: 2021-07-26

## 2021-07-26 VITALS
DIASTOLIC BLOOD PRESSURE: 65 MMHG | TEMPERATURE: 97 F | SYSTOLIC BLOOD PRESSURE: 137 MMHG | OXYGEN SATURATION: 99 % | RESPIRATION RATE: 18 BRPM | HEART RATE: 77 BPM

## 2021-07-26 DIAGNOSIS — J01.40 ACUTE NON-RECURRENT PANSINUSITIS: Primary | ICD-10-CM

## 2021-07-26 DIAGNOSIS — J06.9 UPPER RESPIRATORY TRACT INFECTION, UNSPECIFIED TYPE: Primary | ICD-10-CM

## 2021-07-26 LAB
S PYO AG THROAT QL: NEGATIVE
SARS-COV-2 RNA RESP QL NAA+PROBE: NOT DETECTED

## 2021-07-26 PROCEDURE — 87880 STREP A ASSAY W/OPTIC: CPT

## 2021-07-26 PROCEDURE — 99213 OFFICE O/P EST LOW 20 MIN: CPT | Performed by: FAMILY MEDICINE

## 2021-07-26 PROCEDURE — 99213 OFFICE O/P EST LOW 20 MIN: CPT

## 2021-07-26 RX ORDER — MECLIZINE HYDROCHLORIDE 25 MG/1
25 TABLET ORAL 3 TIMES DAILY PRN
Qty: 30 TABLET | Refills: 0 | Status: SHIPPED | OUTPATIENT
Start: 2021-07-26

## 2021-07-26 RX ORDER — AMOXICILLIN AND CLAVULANATE POTASSIUM 875; 125 MG/1; MG/1
1 TABLET, FILM COATED ORAL EVERY 12 HOURS
Qty: 20 TABLET | Refills: 0 | Status: SHIPPED | OUTPATIENT
Start: 2021-07-26 | End: 2021-08-20 | Stop reason: ALTCHOICE

## 2021-07-26 ASSESSMENT — ENCOUNTER SYMPTOMS
FEVER: 0
BRUISES/BLEEDS EASILY: 0
DIZZINESS: 1
PSYCHIATRIC NEGATIVE: 1
EYE ITCHING: 1
EYE DISCHARGE: 1
SHORTNESS OF BREATH: 0
CHILLS: 1
TROUBLE SWALLOWING: 1
SINUS PAIN: 1
VOMITING: 0
UNEXPECTED WEIGHT CHANGE: 0
LIGHT-HEADEDNESS: 1
ACTIVITY CHANGE: 1
SORE THROAT: 1
DIAPHORESIS: 1
FATIGUE: 1
NAUSEA: 1
APPETITE CHANGE: 1
HEADACHES: 1
SINUS PRESSURE: 1
COUGH: 1
DIARRHEA: 0

## 2021-07-26 NOTE — ED PROVIDER NOTES
Patient Seen in: [facility]      History   Patient presents with:  Testing    Stated Complaint: TESTING    HPI/Subjective:   HPI    68-year-old female presents for evaluation for congestion, sore throat and cough for the past 2 days.   She has 99%         Physical Exam  Vitals and nursing note reviewed. Constitutional:       General: She is not in acute distress. Appearance: Normal appearance. She is well-developed. HENT:      Head: Normocephalic and atraumatic. Jaw: No trismus. Attention and Perception: Attention normal.         Mood and Affect: Mood normal.         Speech: Speech normal.         Behavior: Behavior normal.               ED Course     Labs Reviewed   POCT RAPID STREP - Normal   RAPID SARS-COV-2 BY PCR - Silvia Nelson

## 2021-07-26 NOTE — ED INITIAL ASSESSMENT (HPI)
PATIENT ARRIVED AMBULATORY TO ROOM C/O SYMPTOMS THAT STARTED 2 DAYS AGO. +NASAL CONGESTION. +COUGH. +SORE THROAT. NO FEVERS. EASY NON LABORED RESPIRATIONS.

## 2021-08-02 ENCOUNTER — TELEPHONE (OUTPATIENT)
Dept: SCHEDULING | Age: 61
End: 2021-08-02

## 2021-08-05 ENCOUNTER — OFFICE VISIT (OUTPATIENT)
Dept: FAMILY MEDICINE | Age: 61
End: 2021-08-05

## 2021-08-05 VITALS
HEART RATE: 66 BPM | BODY MASS INDEX: 30.11 KG/M2 | SYSTOLIC BLOOD PRESSURE: 117 MMHG | HEIGHT: 64 IN | OXYGEN SATURATION: 98 % | RESPIRATION RATE: 16 BRPM | WEIGHT: 176.37 LBS | TEMPERATURE: 98.4 F | DIASTOLIC BLOOD PRESSURE: 77 MMHG

## 2021-08-05 DIAGNOSIS — Z20.822 SUSPECTED COVID-19 VIRUS INFECTION: Primary | ICD-10-CM

## 2021-08-05 DIAGNOSIS — R51.9 DAILY HEADACHE: ICD-10-CM

## 2021-08-05 PROCEDURE — 99213 OFFICE O/P EST LOW 20 MIN: CPT | Performed by: FAMILY MEDICINE

## 2021-08-10 ASSESSMENT — ENCOUNTER SYMPTOMS
VOMITING: 0
APPETITE CHANGE: 1
FEVER: 0
PSYCHIATRIC NEGATIVE: 1
EYE ITCHING: 1
ACTIVITY CHANGE: 1
SINUS PAIN: 1
SORE THROAT: 1
EYE DISCHARGE: 1
LIGHT-HEADEDNESS: 1
DIZZINESS: 1
HEADACHES: 1
NAUSEA: 1
DIARRHEA: 0
UNEXPECTED WEIGHT CHANGE: 0
BRUISES/BLEEDS EASILY: 0
FATIGUE: 1
COUGH: 0
TROUBLE SWALLOWING: 0
SINUS PRESSURE: 1
CHILLS: 1
SHORTNESS OF BREATH: 0
DIAPHORESIS: 1

## 2021-08-13 ENCOUNTER — APPOINTMENT (OUTPATIENT)
Dept: FAMILY MEDICINE | Age: 61
End: 2021-08-13

## 2021-08-13 ENCOUNTER — TELEPHONE (OUTPATIENT)
Dept: SCHEDULING | Age: 61
End: 2021-08-13

## 2021-08-13 ENCOUNTER — TELEPHONE (OUTPATIENT)
Dept: FAMILY MEDICINE | Age: 61
End: 2021-08-13

## 2021-08-18 ENCOUNTER — TELEPHONE (OUTPATIENT)
Dept: FAMILY MEDICINE | Age: 61
End: 2021-08-18

## 2021-08-19 ENCOUNTER — TELEPHONE (OUTPATIENT)
Dept: SCHEDULING | Age: 61
End: 2021-08-19

## 2021-08-20 ENCOUNTER — V-VISIT (OUTPATIENT)
Dept: FAMILY MEDICINE | Age: 61
End: 2021-08-20

## 2021-08-20 DIAGNOSIS — R51.9 ACUTE INTRACTABLE HEADACHE, UNSPECIFIED HEADACHE TYPE: ICD-10-CM

## 2021-08-20 DIAGNOSIS — Z20.822 SUSPECTED COVID-19 VIRUS INFECTION: Primary | ICD-10-CM

## 2021-08-20 PROCEDURE — 99213 OFFICE O/P EST LOW 20 MIN: CPT | Performed by: FAMILY MEDICINE

## 2021-08-21 ENCOUNTER — HOSPITAL ENCOUNTER (OUTPATIENT)
Age: 61
Discharge: HOME | End: 2021-08-21
Attending: EMERGENCY MEDICINE
Payer: COMMERCIAL

## 2021-08-21 ENCOUNTER — APPOINTMENT (OUTPATIENT)
Dept: CT IMAGING | Age: 61
End: 2021-08-21
Attending: EMERGENCY MEDICINE
Payer: COMMERCIAL

## 2021-08-21 VITALS
HEART RATE: 68 BPM | SYSTOLIC BLOOD PRESSURE: 129 MMHG | RESPIRATION RATE: 18 BRPM | TEMPERATURE: 98 F | OXYGEN SATURATION: 98 % | DIASTOLIC BLOOD PRESSURE: 80 MMHG

## 2021-08-21 DIAGNOSIS — R51.9 PERSISTENT HEADACHES: Primary | ICD-10-CM

## 2021-08-21 LAB — SARS-COV-2 RNA RESP QL NAA+PROBE: NOT DETECTED

## 2021-08-21 PROCEDURE — 99214 OFFICE O/P EST MOD 30 MIN: CPT

## 2021-08-21 PROCEDURE — 70450 CT HEAD/BRAIN W/O DYE: CPT | Performed by: EMERGENCY MEDICINE

## 2021-08-21 NOTE — ED PROVIDER NOTES
Patient Seen in: [facility]      History   Patient presents with:  Headache    Stated Complaint: whole top and back of head pain, feels pressure on back of eyes    HPI/Subjective:   HPI    Patient is a 82-year-old female with no significant p reviewed. All other systems reviewed and negative except as noted above.     Physical Exam     ED Triage Vitals [08/21/21 1451]   /80   Pulse 68   Resp 18   Temp 97.6 °F (36.4 °C)   Temp src Temporal   SpO2 98 %   O2 Device None (Room air)

## 2021-08-21 NOTE — ED INITIAL ASSESSMENT (HPI)
Headache and runny nose and congestion for 1 month, had e visit with pmd yesterday, no fever, no cough, pt has had covid and is fully vaccinated

## 2021-08-27 ENCOUNTER — OFFICE VISIT (OUTPATIENT)
Dept: FAMILY MEDICINE | Age: 61
End: 2021-08-27

## 2021-08-27 VITALS
TEMPERATURE: 98 F | RESPIRATION RATE: 16 BRPM | OXYGEN SATURATION: 96 % | HEART RATE: 63 BPM | WEIGHT: 173.5 LBS | DIASTOLIC BLOOD PRESSURE: 64 MMHG | HEIGHT: 63 IN | SYSTOLIC BLOOD PRESSURE: 92 MMHG | BODY MASS INDEX: 30.74 KG/M2

## 2021-08-27 DIAGNOSIS — R51.9 PERSISTENT HEADACHES: Primary | ICD-10-CM

## 2021-08-27 DIAGNOSIS — R11.0 NAUSEA: ICD-10-CM

## 2021-08-27 PROCEDURE — 80050 GENERAL HEALTH PANEL: CPT | Performed by: FAMILY MEDICINE

## 2021-08-27 PROCEDURE — 85652 RBC SED RATE AUTOMATED: CPT | Performed by: FAMILY MEDICINE

## 2021-08-27 PROCEDURE — 86140 C-REACTIVE PROTEIN: CPT | Performed by: FAMILY MEDICINE

## 2021-08-27 PROCEDURE — 99213 OFFICE O/P EST LOW 20 MIN: CPT | Performed by: FAMILY MEDICINE

## 2021-08-27 PROCEDURE — 36415 COLL VENOUS BLD VENIPUNCTURE: CPT

## 2021-08-27 RX ORDER — SUMATRIPTAN 50 MG/1
TABLET, FILM COATED ORAL
Qty: 30 TABLET | Refills: 0 | Status: SHIPPED | OUTPATIENT
Start: 2021-08-27 | End: 2021-12-09 | Stop reason: ALTCHOICE

## 2021-08-27 ASSESSMENT — ENCOUNTER SYMPTOMS
HEADACHES: 1
PHOTOPHOBIA: 0
LIGHT-HEADEDNESS: 0
FATIGUE: 1
BRUISES/BLEEDS EASILY: 0
SHORTNESS OF BREATH: 0
FEVER: 0
ALLERGIC/IMMUNOLOGIC COMMENTS: NKDA
DIZZINESS: 1
NAUSEA: 1
PSYCHIATRIC NEGATIVE: 1
SINUS PRESSURE: 1
APPETITE CHANGE: 0

## 2021-08-27 ASSESSMENT — PAIN SCALES - GENERAL: PAINLEVEL: 7

## 2021-08-28 LAB
ALBUMIN SERPL-MCNC: 4 G/DL (ref 3.6–5.1)
ALBUMIN/GLOB SERPL: 1.1 {RATIO} (ref 1–2.4)
ALP SERPL-CCNC: 87 UNITS/L (ref 45–117)
ALT SERPL-CCNC: 24 UNITS/L
ANION GAP SERPL CALC-SCNC: 11 MMOL/L (ref 10–20)
AST SERPL-CCNC: 17 UNITS/L
BASOPHILS # BLD: 0 K/MCL (ref 0–0.3)
BASOPHILS NFR BLD: 1 %
BILIRUB SERPL-MCNC: 0.3 MG/DL (ref 0.2–1)
BUN SERPL-MCNC: 13 MG/DL (ref 6–20)
BUN/CREAT SERPL: 19 (ref 7–25)
CALCIUM SERPL-MCNC: 9.6 MG/DL (ref 8.4–10.2)
CHLORIDE SERPL-SCNC: 106 MMOL/L (ref 98–107)
CO2 SERPL-SCNC: 29 MMOL/L (ref 21–32)
CREAT SERPL-MCNC: 0.68 MG/DL (ref 0.51–0.95)
CRP SERPL-MCNC: 0.9 MG/DL
DEPRECATED RDW RBC: 46 FL (ref 39–50)
EOSINOPHIL # BLD: 0.1 K/MCL (ref 0–0.5)
EOSINOPHIL NFR BLD: 2 %
ERYTHROCYTE [DISTWIDTH] IN BLOOD: 13.7 % (ref 11–15)
ERYTHROCYTE [SEDIMENTATION RATE] IN BLOOD BY WESTERGREN METHOD: 25 MM/HR (ref 0–20)
FASTING DURATION TIME PATIENT: NORMAL H
GFR SERPLBLD BASED ON 1.73 SQ M-ARVRAT: >90 ML/MIN
GLOBULIN SER-MCNC: 3.8 G/DL (ref 2–4)
GLUCOSE SERPL-MCNC: 94 MG/DL (ref 65–99)
HCT VFR BLD CALC: 44 % (ref 36–46.5)
HGB BLD-MCNC: 13.6 G/DL (ref 12–15.5)
IMM GRANULOCYTES # BLD AUTO: 0 K/MCL (ref 0–0.2)
IMM GRANULOCYTES # BLD: 0 %
LYMPHOCYTES # BLD: 2.5 K/MCL (ref 1–4)
LYMPHOCYTES NFR BLD: 41 %
MCH RBC QN AUTO: 28.1 PG (ref 26–34)
MCHC RBC AUTO-ENTMCNC: 30.9 G/DL (ref 32–36.5)
MCV RBC AUTO: 90.9 FL (ref 78–100)
MONOCYTES # BLD: 0.5 K/MCL (ref 0.3–0.9)
MONOCYTES NFR BLD: 8 %
NEUTROPHILS # BLD: 2.9 K/MCL (ref 1.8–7.7)
NEUTROPHILS NFR BLD: 48 %
NRBC BLD MANUAL-RTO: 0 /100 WBC
PLATELET # BLD AUTO: 288 K/MCL (ref 140–450)
POTASSIUM SERPL-SCNC: 4.8 MMOL/L (ref 3.4–5.1)
PROT SERPL-MCNC: 7.8 G/DL (ref 6.4–8.2)
RBC # BLD: 4.84 MIL/MCL (ref 4–5.2)
SODIUM SERPL-SCNC: 141 MMOL/L (ref 135–145)
TSH SERPL-ACNC: 3.24 MCUNITS/ML (ref 0.35–5)
WBC # BLD: 6.1 K/MCL (ref 4.2–11)

## 2021-09-04 LAB
HM DILATED EYE EXAM: NORMAL
RETINOPATHY PRESENT (RTP): NO

## 2021-09-07 ENCOUNTER — DOCUMENTATION (OUTPATIENT)
Dept: FAMILY MEDICINE | Age: 61
End: 2021-09-07

## 2021-09-28 ENCOUNTER — OFFICE VISIT (OUTPATIENT)
Dept: FAMILY MEDICINE | Age: 61
End: 2021-09-28

## 2021-09-28 VITALS
TEMPERATURE: 98.2 F | HEART RATE: 74 BPM | WEIGHT: 177.58 LBS | HEIGHT: 63 IN | RESPIRATION RATE: 16 BRPM | BODY MASS INDEX: 31.46 KG/M2 | OXYGEN SATURATION: 97 % | DIASTOLIC BLOOD PRESSURE: 54 MMHG | SYSTOLIC BLOOD PRESSURE: 86 MMHG

## 2021-09-28 DIAGNOSIS — M25.561 CHRONIC PAIN OF BOTH KNEES: ICD-10-CM

## 2021-09-28 DIAGNOSIS — R51.9 PERSISTENT HEADACHES: Primary | ICD-10-CM

## 2021-09-28 DIAGNOSIS — G89.29 CHRONIC PAIN OF BOTH KNEES: ICD-10-CM

## 2021-09-28 DIAGNOSIS — M25.562 CHRONIC PAIN OF BOTH KNEES: ICD-10-CM

## 2021-09-28 PROCEDURE — 99214 OFFICE O/P EST MOD 30 MIN: CPT | Performed by: FAMILY MEDICINE

## 2021-09-28 RX ORDER — CHOLECALCIFEROL (VITAMIN D3) 50 MCG
TABLET ORAL DAILY
COMMUNITY

## 2021-09-28 RX ORDER — LORATADINE 10 MG/1
1 TABLET ORAL DAILY
COMMUNITY

## 2021-09-28 RX ORDER — NORTRIPTYLINE HYDROCHLORIDE 10 MG/1
20 CAPSULE ORAL NIGHTLY
COMMUNITY
Start: 2021-09-16 | End: 2021-12-09 | Stop reason: ALTCHOICE

## 2021-09-28 RX ORDER — MELOXICAM 7.5 MG/1
7.5 TABLET ORAL 2 TIMES DAILY
Qty: 60 TABLET | Refills: 0 | Status: SHIPPED | OUTPATIENT
Start: 2021-09-28 | End: 2021-10-28

## 2021-09-28 ASSESSMENT — PAIN SCALES - GENERAL: PAINLEVEL: 7

## 2021-10-01 ASSESSMENT — ENCOUNTER SYMPTOMS
ACTIVITY CHANGE: 1
BRUISES/BLEEDS EASILY: 0
FEVER: 0
HEADACHES: 1
DIARRHEA: 0
SINUS PAIN: 0
COUGH: 0
ABDOMINAL PAIN: 0
SHORTNESS OF BREATH: 0
VOMITING: 0
PHOTOPHOBIA: 1
FATIGUE: 1
SINUS PRESSURE: 0
ALLERGIC/IMMUNOLOGIC COMMENTS: NKDA
NAUSEA: 1

## 2021-10-06 ENCOUNTER — TELEPHONE (OUTPATIENT)
Dept: SCHEDULING | Age: 61
End: 2021-10-06

## 2021-12-09 ENCOUNTER — OFFICE VISIT (OUTPATIENT)
Dept: FAMILY MEDICINE | Age: 61
End: 2021-12-09

## 2021-12-09 VITALS
HEIGHT: 63 IN | WEIGHT: 180.34 LBS | BODY MASS INDEX: 31.95 KG/M2 | HEART RATE: 97 BPM | TEMPERATURE: 98.1 F | SYSTOLIC BLOOD PRESSURE: 124 MMHG | RESPIRATION RATE: 18 BRPM | DIASTOLIC BLOOD PRESSURE: 83 MMHG | OXYGEN SATURATION: 97 %

## 2021-12-09 DIAGNOSIS — M25.561 PAIN IN BOTH KNEES, UNSPECIFIED CHRONICITY: Primary | ICD-10-CM

## 2021-12-09 DIAGNOSIS — M25.562 PAIN IN BOTH KNEES, UNSPECIFIED CHRONICITY: Primary | ICD-10-CM

## 2021-12-09 DIAGNOSIS — R73.03 PREDIABETES: ICD-10-CM

## 2021-12-09 PROCEDURE — 99214 OFFICE O/P EST MOD 30 MIN: CPT | Performed by: FAMILY MEDICINE

## 2021-12-09 PROCEDURE — 82043 UR ALBUMIN QUANTITATIVE: CPT | Performed by: PSYCHIATRY & NEUROLOGY

## 2021-12-09 PROCEDURE — 82570 ASSAY OF URINE CREATININE: CPT | Performed by: PSYCHIATRY & NEUROLOGY

## 2021-12-09 RX ORDER — DIVALPROEX SODIUM 250 MG/1
250 TABLET, EXTENDED RELEASE ORAL AT BEDTIME
COMMUNITY
Start: 2021-10-09

## 2021-12-09 RX ORDER — UBROGEPANT 50 MG/1
TABLET ORAL
COMMUNITY
Start: 2021-10-09

## 2021-12-09 ASSESSMENT — ENCOUNTER SYMPTOMS
ABDOMINAL PAIN: 0
SHORTNESS OF BREATH: 0
VOMITING: 0
DIARRHEA: 0
ALLERGIC/IMMUNOLOGIC COMMENTS: NKDA
SINUS PRESSURE: 0
FATIGUE: 1
NAUSEA: 0
HEADACHES: 0
PSYCHIATRIC NEGATIVE: 1
SINUS PAIN: 0
BRUISES/BLEEDS EASILY: 0

## 2021-12-09 ASSESSMENT — PAIN SCALES - GENERAL: PAINLEVEL: 7

## 2021-12-10 LAB
CREAT UR-MCNC: 227 MG/DL
MICROALBUMIN UR-MCNC: 1.01 MG/DL
MICROALBUMIN/CREAT UR: 4.4 MG/G

## 2022-02-08 ENCOUNTER — OFFICE VISIT (OUTPATIENT)
Dept: FAMILY MEDICINE CLINIC | Facility: CLINIC | Age: 62
End: 2022-02-08
Payer: COMMERCIAL

## 2022-02-08 VITALS
DIASTOLIC BLOOD PRESSURE: 76 MMHG | HEART RATE: 69 BPM | HEIGHT: 63.4 IN | BODY MASS INDEX: 31.32 KG/M2 | SYSTOLIC BLOOD PRESSURE: 116 MMHG | WEIGHT: 179 LBS | TEMPERATURE: 98 F

## 2022-02-08 DIAGNOSIS — G89.29 CHRONIC PAIN OF BOTH KNEES: ICD-10-CM

## 2022-02-08 DIAGNOSIS — G89.29 CHRONIC BILATERAL LOW BACK PAIN WITHOUT SCIATICA: ICD-10-CM

## 2022-02-08 DIAGNOSIS — H91.93 BILATERAL HEARING LOSS, UNSPECIFIED HEARING LOSS TYPE: ICD-10-CM

## 2022-02-08 DIAGNOSIS — Z13.6 ENCOUNTER FOR LIPID SCREENING FOR CARDIOVASCULAR DISEASE: ICD-10-CM

## 2022-02-08 DIAGNOSIS — Z13.220 ENCOUNTER FOR LIPID SCREENING FOR CARDIOVASCULAR DISEASE: ICD-10-CM

## 2022-02-08 DIAGNOSIS — H92.03 EAR PAIN, BILATERAL: ICD-10-CM

## 2022-02-08 DIAGNOSIS — M25.562 CHRONIC PAIN OF BOTH KNEES: ICD-10-CM

## 2022-02-08 DIAGNOSIS — Z13.1 SCREENING FOR DIABETES MELLITUS: ICD-10-CM

## 2022-02-08 DIAGNOSIS — J02.9 SORE THROAT: ICD-10-CM

## 2022-02-08 DIAGNOSIS — M25.561 CHRONIC PAIN OF BOTH KNEES: ICD-10-CM

## 2022-02-08 DIAGNOSIS — E66.9 OBESITY (BMI 30.0-34.9): ICD-10-CM

## 2022-02-08 DIAGNOSIS — J45.20 MILD INTERMITTENT ASTHMA WITHOUT COMPLICATION: ICD-10-CM

## 2022-02-08 DIAGNOSIS — R53.83 FATIGUE, UNSPECIFIED TYPE: Primary | ICD-10-CM

## 2022-02-08 DIAGNOSIS — M54.50 CHRONIC BILATERAL LOW BACK PAIN WITHOUT SCIATICA: ICD-10-CM

## 2022-02-08 PROCEDURE — 3074F SYST BP LT 130 MM HG: CPT | Performed by: FAMILY MEDICINE

## 2022-02-08 PROCEDURE — 3078F DIAST BP <80 MM HG: CPT | Performed by: FAMILY MEDICINE

## 2022-02-08 PROCEDURE — 99203 OFFICE O/P NEW LOW 30 MIN: CPT | Performed by: FAMILY MEDICINE

## 2022-02-08 PROCEDURE — 3008F BODY MASS INDEX DOCD: CPT | Performed by: FAMILY MEDICINE

## 2022-02-08 RX ORDER — ERGOCALCIFEROL (VITAMIN D2) 1250 MCG
CAPSULE ORAL
COMMUNITY

## 2022-02-08 RX ORDER — UBROGEPANT 50 MG/1
50 TABLET ORAL
COMMUNITY

## 2022-02-08 RX ORDER — ALBUTEROL SULFATE 90 UG/1
AEROSOL, METERED RESPIRATORY (INHALATION) AS DIRECTED
COMMUNITY

## 2022-02-08 RX ORDER — NEOMYCIN SULFATE, POLYMYXIN B SULFATE AND HYDROCORTISONE 10; 3.5; 1 MG/ML; MG/ML; [USP'U]/ML
3 SUSPENSION/ DROPS AURICULAR (OTIC) 3 TIMES DAILY
Qty: 1 EACH | Refills: 0 | Status: SHIPPED | OUTPATIENT
Start: 2022-02-08 | End: 2022-02-15

## 2022-02-08 RX ORDER — GALCANEZUMAB 120 MG/ML
1 INJECTION, SOLUTION SUBCUTANEOUS DAILY
COMMUNITY
Start: 2022-02-02

## 2022-02-08 RX ORDER — AMOXICILLIN 875 MG/1
875 TABLET, COATED ORAL 2 TIMES DAILY
Qty: 20 TABLET | Refills: 0 | Status: SHIPPED | OUTPATIENT
Start: 2022-02-08 | End: 2022-02-18

## 2022-02-12 ENCOUNTER — LAB ENCOUNTER (OUTPATIENT)
Dept: LAB | Age: 62
End: 2022-02-12
Attending: FAMILY MEDICINE
Payer: COMMERCIAL

## 2022-02-12 DIAGNOSIS — G89.29 CHRONIC PAIN OF BOTH KNEES: ICD-10-CM

## 2022-02-12 DIAGNOSIS — Z13.6 ENCOUNTER FOR LIPID SCREENING FOR CARDIOVASCULAR DISEASE: ICD-10-CM

## 2022-02-12 DIAGNOSIS — R53.83 FATIGUE, UNSPECIFIED TYPE: ICD-10-CM

## 2022-02-12 DIAGNOSIS — Z13.1 SCREENING FOR DIABETES MELLITUS: ICD-10-CM

## 2022-02-12 DIAGNOSIS — M25.562 CHRONIC PAIN OF BOTH KNEES: ICD-10-CM

## 2022-02-12 DIAGNOSIS — Z13.220 ENCOUNTER FOR LIPID SCREENING FOR CARDIOVASCULAR DISEASE: ICD-10-CM

## 2022-02-12 DIAGNOSIS — M25.561 CHRONIC PAIN OF BOTH KNEES: ICD-10-CM

## 2022-02-12 LAB
ALBUMIN SERPL-MCNC: 3.9 G/DL (ref 3.4–5)
ALBUMIN/GLOB SERPL: 1.1 {RATIO} (ref 1–2)
ALP LIVER SERPL-CCNC: 93 U/L
ALT SERPL-CCNC: 23 U/L
ANION GAP SERPL CALC-SCNC: 1 MMOL/L (ref 0–18)
AST SERPL-CCNC: 16 U/L (ref 15–37)
BASOPHILS # BLD AUTO: 0.02 X10(3) UL (ref 0–0.2)
BASOPHILS NFR BLD AUTO: 0.4 %
BILIRUB SERPL-MCNC: 0.6 MG/DL (ref 0.1–2)
BUN/CREAT SERPL: 20.3 (ref 10–20)
CALCIUM BLD-MCNC: 9.2 MG/DL (ref 8.5–10.1)
CHLORIDE SERPL-SCNC: 110 MMOL/L (ref 98–112)
CHOLEST SERPL-MCNC: 206 MG/DL (ref ?–200)
CO2 SERPL-SCNC: 30 MMOL/L (ref 21–32)
CREAT BLD-MCNC: 0.59 MG/DL
CRP SERPL-MCNC: 0.83 MG/DL (ref ?–0.3)
DEPRECATED RDW RBC AUTO: 42.9 FL (ref 35.1–46.3)
EOSINOPHIL # BLD AUTO: 0.16 X10(3) UL (ref 0–0.7)
EOSINOPHIL NFR BLD AUTO: 2.9 %
ERYTHROCYTE [DISTWIDTH] IN BLOOD BY AUTOMATED COUNT: 13.1 % (ref 11–15)
ERYTHROCYTE [SEDIMENTATION RATE] IN BLOOD: 24 MM/HR
EST. AVERAGE GLUCOSE BLD GHB EST-MCNC: 143 MG/DL (ref 68–126)
FASTING PATIENT LIPID ANSWER: YES
FASTING STATUS PATIENT QL REPORTED: YES
GLOBULIN PLAS-MCNC: 3.5 G/DL (ref 2.8–4.4)
GLUCOSE BLD-MCNC: 105 MG/DL (ref 70–99)
HBA1C MFR BLD: 6.6 % (ref ?–5.7)
HCT VFR BLD AUTO: 40 %
HDLC SERPL-MCNC: 50 MG/DL (ref 40–59)
HGB BLD-MCNC: 12.4 G/DL
IMM GRANULOCYTES # BLD AUTO: 0.01 X10(3) UL (ref 0–1)
IMM GRANULOCYTES NFR BLD: 0.2 %
LDLC SERPL CALC-MCNC: 136 MG/DL (ref ?–100)
LYMPHOCYTES # BLD AUTO: 2.09 X10(3) UL (ref 1–4)
LYMPHOCYTES NFR BLD AUTO: 37.8 %
MCH RBC QN AUTO: 27.7 PG (ref 26–34)
MCHC RBC AUTO-ENTMCNC: 31 G/DL (ref 31–37)
MCV RBC AUTO: 89.5 FL
MONOCYTES # BLD AUTO: 0.29 X10(3) UL (ref 0.1–1)
MONOCYTES NFR BLD AUTO: 5.2 %
NEUTROPHILS # BLD AUTO: 2.96 X10 (3) UL (ref 1.5–7.7)
NEUTROPHILS NFR BLD AUTO: 53.5 %
NONHDLC SERPL-MCNC: 156 MG/DL (ref ?–130)
OSMOLALITY SERPL CALC.SUM OF ELEC: 292 MOSM/KG (ref 275–295)
PLATELET # BLD AUTO: 253 10(3)UL (ref 150–450)
POTASSIUM SERPL-SCNC: 4.2 MMOL/L (ref 3.5–5.1)
PROT SERPL-MCNC: 7.4 G/DL (ref 6.4–8.2)
RBC # BLD AUTO: 4.47 X10(6)UL
RHEUMATOID FACT SERPL-ACNC: <10 IU/ML (ref ?–15)
SODIUM SERPL-SCNC: 141 MMOL/L (ref 136–145)
TRIGL SERPL-MCNC: 113 MG/DL (ref 30–149)
TSI SER-ACNC: 1.14 MIU/ML (ref 0.36–3.74)
VLDLC SERPL CALC-MCNC: 21 MG/DL (ref 0–30)
WBC # BLD AUTO: 5.5 X10(3) UL (ref 4–11)

## 2022-02-12 PROCEDURE — 3044F HG A1C LEVEL LT 7.0%: CPT | Performed by: INTERNAL MEDICINE

## 2022-02-12 PROCEDURE — 80061 LIPID PANEL: CPT

## 2022-02-12 PROCEDURE — 36415 COLL VENOUS BLD VENIPUNCTURE: CPT

## 2022-02-12 PROCEDURE — 85025 COMPLETE CBC W/AUTO DIFF WBC: CPT

## 2022-02-12 PROCEDURE — 86140 C-REACTIVE PROTEIN: CPT

## 2022-02-12 PROCEDURE — 86431 RHEUMATOID FACTOR QUANT: CPT

## 2022-02-12 PROCEDURE — 84443 ASSAY THYROID STIM HORMONE: CPT

## 2022-02-12 PROCEDURE — 83036 HEMOGLOBIN GLYCOSYLATED A1C: CPT

## 2022-02-12 PROCEDURE — 85652 RBC SED RATE AUTOMATED: CPT

## 2022-02-12 PROCEDURE — 80053 COMPREHEN METABOLIC PANEL: CPT

## 2022-03-23 ENCOUNTER — OFFICE VISIT (OUTPATIENT)
Dept: FAMILY MEDICINE CLINIC | Facility: CLINIC | Age: 62
End: 2022-03-23
Payer: COMMERCIAL

## 2022-03-23 VITALS
BODY MASS INDEX: 30.27 KG/M2 | SYSTOLIC BLOOD PRESSURE: 120 MMHG | TEMPERATURE: 98 F | HEART RATE: 63 BPM | WEIGHT: 173 LBS | DIASTOLIC BLOOD PRESSURE: 75 MMHG | HEIGHT: 63.4 IN

## 2022-03-23 DIAGNOSIS — R79.82 ELEVATED C-REACTIVE PROTEIN (CRP): ICD-10-CM

## 2022-03-23 DIAGNOSIS — M54.50 CHRONIC BILATERAL LOW BACK PAIN WITHOUT SCIATICA: ICD-10-CM

## 2022-03-23 DIAGNOSIS — E11.9 CONTROLLED TYPE 2 DIABETES MELLITUS WITHOUT COMPLICATION, WITHOUT LONG-TERM CURRENT USE OF INSULIN (HCC): Primary | ICD-10-CM

## 2022-03-23 DIAGNOSIS — E78.00 HYPERCHOLESTEREMIA: ICD-10-CM

## 2022-03-23 DIAGNOSIS — G89.29 CHRONIC BILATERAL LOW BACK PAIN WITHOUT SCIATICA: ICD-10-CM

## 2022-03-23 DIAGNOSIS — M25.561 CHRONIC PAIN OF BOTH KNEES: ICD-10-CM

## 2022-03-23 DIAGNOSIS — M25.562 CHRONIC PAIN OF BOTH KNEES: ICD-10-CM

## 2022-03-23 DIAGNOSIS — Z13.6 ENCOUNTER FOR SCREENING FOR CARDIOVASCULAR DISORDERS: ICD-10-CM

## 2022-03-23 DIAGNOSIS — G89.29 CHRONIC PAIN OF BOTH KNEES: ICD-10-CM

## 2022-03-23 DIAGNOSIS — Z23 NEED FOR PNEUMOCOCCAL VACCINATION: ICD-10-CM

## 2022-03-23 PROCEDURE — 3078F DIAST BP <80 MM HG: CPT | Performed by: FAMILY MEDICINE

## 2022-03-23 PROCEDURE — 3074F SYST BP LT 130 MM HG: CPT | Performed by: FAMILY MEDICINE

## 2022-03-23 PROCEDURE — 3008F BODY MASS INDEX DOCD: CPT | Performed by: FAMILY MEDICINE

## 2022-03-23 PROCEDURE — 99214 OFFICE O/P EST MOD 30 MIN: CPT | Performed by: FAMILY MEDICINE

## 2022-03-23 RX ORDER — NAPROXEN 500 MG/1
500 TABLET ORAL AS DIRECTED
COMMUNITY

## 2022-04-18 NOTE — TELEPHONE ENCOUNTER
Refill request    Not Listed    Drug:  [facility] Lancets 33G 100's    SIG:  Test Daily as directed  Qty:100    Current Outpatient Medications   Medication Sig Dispense Refill

## 2022-04-19 ENCOUNTER — HOSPITAL ENCOUNTER (OUTPATIENT)
Dept: ENDOCRINOLOGY | Facility: HOSPITAL | Age: 62
Discharge: HOME | End: 2022-04-19
Attending: FAMILY MEDICINE
Payer: COMMERCIAL

## 2022-04-19 VITALS — WEIGHT: 166 LBS | BODY MASS INDEX: 29 KG/M2

## 2022-04-19 DIAGNOSIS — E11.9 CONTROLLED TYPE 2 DIABETES MELLITUS WITHOUT COMPLICATION, WITHOUT LONG-TERM CURRENT USE OF INSULIN (HCC): Primary | ICD-10-CM

## 2022-04-19 PROCEDURE — 97802 MEDICAL NUTRITION INDIV IN: CPT

## 2022-04-19 RX ORDER — GLUCOSAM/CHON-MSM1/C/MANG/BOSW 500-416.6
1 TABLET ORAL DAILY
Qty: 100 EACH | Refills: 3 | Status: SHIPPED | OUTPATIENT
Start: 2022-04-19

## 2022-04-19 NOTE — TELEPHONE ENCOUNTER
Refill passed per [facility] protocol. Requested Prescriptions   Pending Prescriptions Disp Refills    TRUEplus Lancets 33G Does not apply Misc 100 each 3     Si lancet by Finger stick route daily.         Diabetic Supplies Protocol Passed - 2022 11:12 AM        Passed - Appointment in past 12 or next 3 months            Recent Outpatient Visits              3 weeks ago Controlled type 2 diabetes mellitus without complication, without long-term current use of insulin [facility])    [facility], [address] MD    Office Visit    2 months ago Fatigue, unspecified type    [facility], [address] MD    Office Visit    2 years ago H. pylori infection    [facility], [address] [place] [address] [place], MARTA    Office Visit    2 years ago Plantar fasciitis, bilateral    [facility], [address], [address] [place] [address] [place], [facility]    Office Visit    2 years ago H. pylori infection    [facility], [address] [place] [address], [facility] [place], APRN    Office Visit          Future Appointments         Provider Department Appt Notes    Today Annel Bermudez, [address] Sched w/son- Order in [place]    In 4 days fOe Rees MD [facility], [address], [place] Discuss medications before leaving country for 3 months

## 2022-04-23 ENCOUNTER — OFFICE VISIT (OUTPATIENT)
Dept: INTERNAL MEDICINE CLINIC | Facility: CLINIC | Age: 62
End: 2022-04-23
Payer: COMMERCIAL

## 2022-04-23 VITALS
HEART RATE: 74 BPM | WEIGHT: 168 LBS | BODY MASS INDEX: 29.4 KG/M2 | HEIGHT: 63.4 IN | SYSTOLIC BLOOD PRESSURE: 122 MMHG | RESPIRATION RATE: 17 BRPM | DIASTOLIC BLOOD PRESSURE: 75 MMHG

## 2022-04-23 DIAGNOSIS — E11.9 CONTROLLED TYPE 2 DIABETES MELLITUS WITHOUT COMPLICATION, WITHOUT LONG-TERM CURRENT USE OF INSULIN (HCC): Primary | ICD-10-CM

## 2022-04-23 PROCEDURE — 3008F BODY MASS INDEX DOCD: CPT | Performed by: INTERNAL MEDICINE

## 2022-04-23 PROCEDURE — 3078F DIAST BP <80 MM HG: CPT | Performed by: INTERNAL MEDICINE

## 2022-04-23 PROCEDURE — 3074F SYST BP LT 130 MM HG: CPT | Performed by: INTERNAL MEDICINE

## 2022-04-23 PROCEDURE — 99214 OFFICE O/P EST MOD 30 MIN: CPT | Performed by: INTERNAL MEDICINE

## 2022-05-26 ENCOUNTER — TELEPHONE (OUTPATIENT)
Dept: FAMILY MEDICINE | Age: 62
End: 2022-05-26

## 2022-07-26 ENCOUNTER — TELEPHONE (OUTPATIENT)
Dept: FAMILY MEDICINE CLINIC | Facility: CLINIC | Age: 62
End: 2022-07-26

## 2022-07-26 NOTE — TELEPHONE ENCOUNTER
Called in script to pharmacy for test strips.
[facility] incoming RX Fax   TRUE METRIX Blood Glucose Test Strp  Quantity 100
impaired

## 2022-09-27 ENCOUNTER — OFFICE VISIT (OUTPATIENT)
Dept: INTERNAL MEDICINE CLINIC | Facility: CLINIC | Age: 62
End: 2022-09-27
Payer: MEDICAID

## 2022-09-27 VITALS
BODY MASS INDEX: 31.5 KG/M2 | DIASTOLIC BLOOD PRESSURE: 74 MMHG | HEIGHT: 63.4 IN | OXYGEN SATURATION: 98 % | WEIGHT: 180 LBS | SYSTOLIC BLOOD PRESSURE: 110 MMHG | HEART RATE: 74 BPM

## 2022-09-27 DIAGNOSIS — Z12.31 BREAST CANCER SCREENING BY MAMMOGRAM: ICD-10-CM

## 2022-09-27 DIAGNOSIS — E11.9 TYPE 2 DIABETES MELLITUS WITHOUT COMPLICATION, WITHOUT LONG-TERM CURRENT USE OF INSULIN (HCC): ICD-10-CM

## 2022-09-27 DIAGNOSIS — R53.83 OTHER FATIGUE: Primary | ICD-10-CM

## 2022-09-27 DIAGNOSIS — K05.10 GINGIVITIS: ICD-10-CM

## 2022-09-27 RX ORDER — GLUCOSAM/CHON-MSM1/C/MANG/BOSW 500-416.6
1 TABLET ORAL DAILY
Qty: 100 EACH | Refills: 3 | Status: SHIPPED | OUTPATIENT
Start: 2022-09-27

## 2022-09-27 RX ORDER — AMOXICILLIN 500 MG/1
500 CAPSULE ORAL 3 TIMES DAILY
Qty: 21 CAPSULE | Refills: 0 | Status: SHIPPED | OUTPATIENT
Start: 2022-09-27

## 2022-09-28 ENCOUNTER — TELEPHONE (OUTPATIENT)
Dept: INTERNAL MEDICINE CLINIC | Facility: CLINIC | Age: 62
End: 2022-09-28

## 2022-09-28 NOTE — TELEPHONE ENCOUNTER
Spoke with Mery Hurd at [facility]    They did receive script and it is ready for pick-up, they will notify the patient.

## 2022-09-28 NOTE — TELEPHONE ENCOUNTER
Patient's  called, on ANABELLE, verified patient name/. Seen by Dr Skylar Renteria yesterday, had 2 scripts but when he went to [facility] they did not have. This RN called [place], was told Amoxicillin ready to . Lancets out of stock but should be back tomorrow after 3 pm.  This RN called  to notify, verbalized understanding and very appreciative of call.

## 2022-09-28 NOTE — TELEPHONE ENCOUNTER
Tj Booker, pt's spouse on the phone will like the script for the following medication to be sent to [facility]. Pt was prescribed this medication yesterday with Dr. Debbie Fontaine but [facility] was not able to fill medication since they did not receive a script from the Dr. Debbie Fontaine.  Please advise    TRUEplus Lancets 33G Does not apply Misc    amoxicillin 500 MG Oral Cap

## 2022-09-29 ENCOUNTER — TELEPHONE (OUTPATIENT)
Dept: INTERNAL MEDICINE CLINIC | Facility: CLINIC | Age: 62
End: 2022-09-29

## 2022-09-29 DIAGNOSIS — E11.9 TYPE 2 DIABETES MELLITUS WITHOUT COMPLICATION, WITHOUT LONG-TERM CURRENT USE OF INSULIN (HCC): Primary | ICD-10-CM

## 2022-09-29 RX ORDER — CALCIUM CITRATE/VITAMIN D3 200MG-6.25
1 TABLET ORAL DAILY
Qty: 100 STRIP | Refills: 3 | Status: SHIPPED | OUTPATIENT
Start: 2022-09-29

## 2022-09-29 NOTE — TELEPHONE ENCOUNTER
Fax received at [address] [place] [address] with following message. Patient is requesting True Metrix test strips.

## 2022-11-08 DIAGNOSIS — J01.40 ACUTE PANSINUSITIS, RECURRENCE NOT SPECIFIED: ICD-10-CM

## 2022-11-08 RX ORDER — FLUTICASONE PROPIONATE 50 MCG
1 SPRAY, SUSPENSION (ML) NASAL 2 TIMES DAILY
Qty: 1 EACH | Refills: 0 | Status: SHIPPED | OUTPATIENT
Start: 2022-11-08

## 2023-02-06 ENCOUNTER — LAB ENCOUNTER (OUTPATIENT)
Dept: LAB | Age: 63
End: 2023-02-06
Attending: INTERNAL MEDICINE
Payer: MEDICAID

## 2023-02-06 DIAGNOSIS — R53.83 OTHER FATIGUE: ICD-10-CM

## 2023-02-06 DIAGNOSIS — E11.9 TYPE 2 DIABETES MELLITUS WITHOUT COMPLICATION, WITHOUT LONG-TERM CURRENT USE OF INSULIN (HCC): ICD-10-CM

## 2023-02-06 LAB
ALBUMIN SERPL-MCNC: 3.9 G/DL (ref 3.4–5)
ALBUMIN/GLOB SERPL: 1.1 {RATIO} (ref 1–2)
ALP LIVER SERPL-CCNC: 67 U/L
ALT SERPL-CCNC: 22 U/L
ANION GAP SERPL CALC-SCNC: 7 MMOL/L (ref 0–18)
AST SERPL-CCNC: 19 U/L (ref 15–37)
BASOPHILS # BLD AUTO: 0.03 X10(3) UL (ref 0–0.2)
BASOPHILS NFR BLD AUTO: 0.6 %
BILIRUB SERPL-MCNC: 0.5 MG/DL (ref 0.1–2)
BUN BLD-MCNC: 16 MG/DL (ref 7–18)
BUN/CREAT SERPL: 23.9 (ref 10–20)
CALCIUM BLD-MCNC: 9.5 MG/DL (ref 8.5–10.1)
CHLORIDE SERPL-SCNC: 109 MMOL/L (ref 98–112)
CHOLEST SERPL-MCNC: 213 MG/DL (ref ?–200)
CO2 SERPL-SCNC: 26 MMOL/L (ref 21–32)
CREAT BLD-MCNC: 0.67 MG/DL
CREAT UR-SCNC: 144 MG/DL
DEPRECATED RDW RBC AUTO: 44.2 FL (ref 35.1–46.3)
EOSINOPHIL # BLD AUTO: 0.12 X10(3) UL (ref 0–0.7)
EOSINOPHIL NFR BLD AUTO: 2.2 %
ERYTHROCYTE [DISTWIDTH] IN BLOOD BY AUTOMATED COUNT: 13.8 % (ref 11–15)
EST. AVERAGE GLUCOSE BLD GHB EST-MCNC: 137 MG/DL (ref 68–126)
FASTING PATIENT LIPID ANSWER: YES
FASTING STATUS PATIENT QL REPORTED: YES
GFR SERPLBLD BASED ON 1.73 SQ M-ARVRAT: 99 ML/MIN/1.73M2 (ref 60–?)
GLOBULIN PLAS-MCNC: 3.7 G/DL (ref 2.8–4.4)
GLUCOSE BLD-MCNC: 113 MG/DL (ref 70–99)
HBA1C MFR BLD: 6.4 % (ref ?–5.7)
HCT VFR BLD AUTO: 42 %
HDLC SERPL-MCNC: 61 MG/DL (ref 40–59)
HGB BLD-MCNC: 13 G/DL
IMM GRANULOCYTES # BLD AUTO: 0.01 X10(3) UL (ref 0–1)
IMM GRANULOCYTES NFR BLD: 0.2 %
LDLC SERPL CALC-MCNC: 138 MG/DL (ref ?–100)
LYMPHOCYTES # BLD AUTO: 1.88 X10(3) UL (ref 1–4)
LYMPHOCYTES NFR BLD AUTO: 34.9 %
MCH RBC QN AUTO: 27.1 PG (ref 26–34)
MCHC RBC AUTO-ENTMCNC: 31 G/DL (ref 31–37)
MCV RBC AUTO: 87.5 FL
MICROALBUMIN UR-MCNC: 0.98 MG/DL
MICROALBUMIN/CREAT 24H UR-RTO: 6.8 UG/MG (ref ?–30)
MONOCYTES # BLD AUTO: 0.36 X10(3) UL (ref 0.1–1)
MONOCYTES NFR BLD AUTO: 6.7 %
NEUTROPHILS # BLD AUTO: 2.98 X10 (3) UL (ref 1.5–7.7)
NEUTROPHILS # BLD AUTO: 2.98 X10(3) UL (ref 1.5–7.7)
NEUTROPHILS NFR BLD AUTO: 55.4 %
NONHDLC SERPL-MCNC: 152 MG/DL (ref ?–130)
OSMOLALITY SERPL CALC.SUM OF ELEC: 296 MOSM/KG (ref 275–295)
PLATELET # BLD AUTO: 282 10(3)UL (ref 150–450)
POTASSIUM SERPL-SCNC: 4 MMOL/L (ref 3.5–5.1)
PROT SERPL-MCNC: 7.6 G/DL (ref 6.4–8.2)
RBC # BLD AUTO: 4.8 X10(6)UL
SODIUM SERPL-SCNC: 142 MMOL/L (ref 136–145)
TRIGL SERPL-MCNC: 79 MG/DL (ref 30–149)
TSI SER-ACNC: 2.05 MIU/ML (ref 0.36–3.74)
VLDLC SERPL CALC-MCNC: 14 MG/DL (ref 0–30)
WBC # BLD AUTO: 5.4 X10(3) UL (ref 4–11)

## 2023-02-06 PROCEDURE — 3044F HG A1C LEVEL LT 7.0%: CPT | Performed by: INTERNAL MEDICINE

## 2023-02-06 PROCEDURE — 80053 COMPREHEN METABOLIC PANEL: CPT

## 2023-02-06 PROCEDURE — 83036 HEMOGLOBIN GLYCOSYLATED A1C: CPT

## 2023-02-06 PROCEDURE — 3061F NEG MICROALBUMINURIA REV: CPT | Performed by: INTERNAL MEDICINE

## 2023-02-06 PROCEDURE — 36415 COLL VENOUS BLD VENIPUNCTURE: CPT

## 2023-02-06 PROCEDURE — 85025 COMPLETE CBC W/AUTO DIFF WBC: CPT

## 2023-02-06 PROCEDURE — 80061 LIPID PANEL: CPT

## 2023-02-06 PROCEDURE — 82043 UR ALBUMIN QUANTITATIVE: CPT

## 2023-02-06 PROCEDURE — 84443 ASSAY THYROID STIM HORMONE: CPT

## 2023-02-06 PROCEDURE — 82570 ASSAY OF URINE CREATININE: CPT

## 2023-02-08 ENCOUNTER — TELEPHONE (OUTPATIENT)
Dept: INTERNAL MEDICINE CLINIC | Facility: CLINIC | Age: 63
End: 2023-02-08

## 2023-02-08 DIAGNOSIS — E11.9 TYPE 2 DIABETES MELLITUS WITHOUT COMPLICATION, WITHOUT LONG-TERM CURRENT USE OF INSULIN (HCC): Primary | ICD-10-CM

## 2023-03-06 RX ORDER — IBUPROFEN 800 MG/1
800 TABLET ORAL EVERY 6 HOURS PRN
COMMUNITY
Start: 2022-10-13

## 2023-04-25 ENCOUNTER — OFFICE VISIT (OUTPATIENT)
Dept: INTERNAL MEDICINE CLINIC | Facility: CLINIC | Age: 63
End: 2023-04-25

## 2023-04-25 ENCOUNTER — HOSPITAL ENCOUNTER (OUTPATIENT)
Dept: GENERAL RADIOLOGY | Age: 63
Discharge: HOME | End: 2023-04-25
Attending: INTERNAL MEDICINE
Payer: MEDICAID

## 2023-04-25 VITALS
DIASTOLIC BLOOD PRESSURE: 70 MMHG | OXYGEN SATURATION: 95 % | BODY MASS INDEX: 31.85 KG/M2 | RESPIRATION RATE: 18 BRPM | HEART RATE: 67 BPM | SYSTOLIC BLOOD PRESSURE: 138 MMHG | WEIGHT: 182 LBS | HEIGHT: 63.4 IN

## 2023-04-25 DIAGNOSIS — M25.552 PAIN OF LEFT HIP: ICD-10-CM

## 2023-04-25 DIAGNOSIS — Z12.31 BREAST CANCER SCREENING BY MAMMOGRAM: ICD-10-CM

## 2023-04-25 DIAGNOSIS — R73.03 PREDIABETES: Primary | ICD-10-CM

## 2023-04-25 DIAGNOSIS — Z13.9 SCREENING FOR CONDITION: ICD-10-CM

## 2023-04-25 PROCEDURE — 3075F SYST BP GE 130 - 139MM HG: CPT | Performed by: INTERNAL MEDICINE

## 2023-04-25 PROCEDURE — 73502 X-RAY EXAM HIP UNI 2-3 VIEWS: CPT | Performed by: INTERNAL MEDICINE

## 2023-04-25 PROCEDURE — 3078F DIAST BP <80 MM HG: CPT | Performed by: INTERNAL MEDICINE

## 2023-04-25 PROCEDURE — 3008F BODY MASS INDEX DOCD: CPT | Performed by: INTERNAL MEDICINE

## 2023-04-25 PROCEDURE — 99214 OFFICE O/P EST MOD 30 MIN: CPT | Performed by: INTERNAL MEDICINE

## 2023-04-30 PROBLEM — J45.21 MILD INTERMITTENT ASTHMA WITH EXACERBATION: Status: RESOLVED | Noted: 2018-05-09 | Resolved: 2023-04-30

## 2023-04-30 PROBLEM — J45.21 MILD INTERMITTENT ASTHMA WITH EXACERBATION (HCC): Status: RESOLVED | Noted: 2018-05-09 | Resolved: 2023-04-30

## 2023-04-30 PROBLEM — E11.9 CONTROLLED TYPE 2 DIABETES MELLITUS WITHOUT COMPLICATION, WITHOUT LONG-TERM CURRENT USE OF INSULIN (HCC): Status: RESOLVED | Noted: 2022-03-23 | Resolved: 2023-04-30

## 2023-05-08 ENCOUNTER — HOSPITAL ENCOUNTER (EMERGENCY)
Facility: HOSPITAL | Age: 63
Discharge: HOME | End: 2023-05-08
Attending: EMERGENCY MEDICINE
Payer: MEDICAID

## 2023-05-08 VITALS
SYSTOLIC BLOOD PRESSURE: 105 MMHG | TEMPERATURE: 98 F | HEART RATE: 76 BPM | DIASTOLIC BLOOD PRESSURE: 66 MMHG | RESPIRATION RATE: 18 BRPM | WEIGHT: 176.38 LBS | BODY MASS INDEX: 31 KG/M2 | OXYGEN SATURATION: 96 %

## 2023-05-08 DIAGNOSIS — J02.0 STREPTOCOCCAL SORE THROAT: Primary | ICD-10-CM

## 2023-05-08 LAB
FLUAV + FLUBV RNA SPEC NAA+PROBE: NEGATIVE
FLUAV + FLUBV RNA SPEC NAA+PROBE: NEGATIVE
RSV RNA SPEC NAA+PROBE: NEGATIVE
S PYO AG THROAT QL: POSITIVE
SARS-COV-2 RNA RESP QL NAA+PROBE: NOT DETECTED

## 2023-05-08 PROCEDURE — 87880 STREP A ASSAY W/OPTIC: CPT

## 2023-05-08 PROCEDURE — 99283 EMERGENCY DEPT VISIT LOW MDM: CPT

## 2023-05-08 PROCEDURE — 0241U SARS-COV-2/FLU A AND B/RSV BY PCR (GENEXPERT): CPT | Performed by: EMERGENCY MEDICINE

## 2023-05-08 RX ORDER — AMOXICILLIN 500 MG/1
1000 TABLET, FILM COATED ORAL DAILY
Qty: 20 TABLET | Refills: 0 | Status: SHIPPED | OUTPATIENT
Start: 2023-05-08 | End: 2023-05-18

## 2023-05-08 RX ORDER — DEXAMETHASONE 4 MG/1
8 TABLET ORAL ONCE
Status: COMPLETED | OUTPATIENT
Start: 2023-05-08 | End: 2023-05-08

## 2023-05-09 ENCOUNTER — LAB ENCOUNTER (OUTPATIENT)
Dept: LAB | Age: 63
End: 2023-05-09
Attending: INTERNAL MEDICINE
Payer: MEDICAID

## 2023-05-09 DIAGNOSIS — Z13.9 SCREENING FOR CONDITION: ICD-10-CM

## 2023-05-09 DIAGNOSIS — R73.03 PREDIABETES: ICD-10-CM

## 2023-05-09 LAB
ALBUMIN SERPL-MCNC: 3.7 G/DL (ref 3.4–5)
ALBUMIN/GLOB SERPL: 0.9 {RATIO} (ref 1–2)
ALP LIVER SERPL-CCNC: 66 U/L
ALT SERPL-CCNC: 19 U/L
ANION GAP SERPL CALC-SCNC: 5 MMOL/L (ref 0–18)
AST SERPL-CCNC: 12 U/L (ref 15–37)
BASOPHILS # BLD AUTO: 0.03 X10(3) UL (ref 0–0.2)
BASOPHILS NFR BLD AUTO: 0.3 %
BILIRUB SERPL-MCNC: 0.4 MG/DL (ref 0.1–2)
BILIRUB UR QL: NEGATIVE
BUN BLD-MCNC: 19 MG/DL (ref 7–18)
BUN/CREAT SERPL: 27.1 (ref 10–20)
CALCIUM BLD-MCNC: 9.6 MG/DL (ref 8.5–10.1)
CHLORIDE SERPL-SCNC: 108 MMOL/L (ref 98–112)
CHOLEST SERPL-MCNC: 187 MG/DL (ref ?–200)
CO2 SERPL-SCNC: 26 MMOL/L (ref 21–32)
COLOR UR: YELLOW
CREAT BLD-MCNC: 0.7 MG/DL
CREAT UR-SCNC: 193 MG/DL
DEPRECATED RDW RBC AUTO: 43.3 FL (ref 35.1–46.3)
EOSINOPHIL # BLD AUTO: 0.03 X10(3) UL (ref 0–0.7)
EOSINOPHIL NFR BLD AUTO: 0.3 %
ERYTHROCYTE [DISTWIDTH] IN BLOOD BY AUTOMATED COUNT: 13.8 % (ref 11–15)
EST. AVERAGE GLUCOSE BLD GHB EST-MCNC: 134 MG/DL (ref 68–126)
FASTING PATIENT LIPID ANSWER: YES
FASTING STATUS PATIENT QL REPORTED: YES
GFR SERPLBLD BASED ON 1.73 SQ M-ARVRAT: 98 ML/MIN/1.73M2 (ref 60–?)
GLOBULIN PLAS-MCNC: 4 G/DL (ref 2.8–4.4)
GLUCOSE BLD-MCNC: 118 MG/DL (ref 70–99)
GLUCOSE UR-MCNC: NORMAL MG/DL
HBA1C MFR BLD: 6.3 % (ref ?–5.7)
HCT VFR BLD AUTO: 39.9 %
HDLC SERPL-MCNC: 70 MG/DL (ref 40–59)
HGB BLD-MCNC: 12.8 G/DL
HGB UR QL STRIP.AUTO: NEGATIVE
IMM GRANULOCYTES # BLD AUTO: 0.04 X10(3) UL (ref 0–1)
IMM GRANULOCYTES NFR BLD: 0.4 %
KETONES UR-MCNC: NEGATIVE MG/DL
LDLC SERPL CALC-MCNC: 105 MG/DL (ref ?–100)
LEUKOCYTE ESTERASE UR QL STRIP.AUTO: NEGATIVE
LYMPHOCYTES # BLD AUTO: 2.38 X10(3) UL (ref 1–4)
LYMPHOCYTES NFR BLD AUTO: 21 %
MCH RBC QN AUTO: 27.5 PG (ref 26–34)
MCHC RBC AUTO-ENTMCNC: 32.1 G/DL (ref 31–37)
MCV RBC AUTO: 85.6 FL
MICROALBUMIN UR-MCNC: 2.1 MG/DL
MICROALBUMIN/CREAT 24H UR-RTO: 10.9 UG/MG (ref ?–30)
MONOCYTES # BLD AUTO: 0.66 X10(3) UL (ref 0.1–1)
MONOCYTES NFR BLD AUTO: 5.8 %
NEUTROPHILS # BLD AUTO: 8.18 X10 (3) UL (ref 1.5–7.7)
NEUTROPHILS # BLD AUTO: 8.18 X10(3) UL (ref 1.5–7.7)
NEUTROPHILS NFR BLD AUTO: 72.2 %
NITRITE UR QL STRIP.AUTO: NEGATIVE
NONHDLC SERPL-MCNC: 117 MG/DL (ref ?–130)
OSMOLALITY SERPL CALC.SUM OF ELEC: 291 MOSM/KG (ref 275–295)
PH UR: 5.5 [PH] (ref 5–8)
PLATELET # BLD AUTO: 280 10(3)UL (ref 150–450)
POTASSIUM SERPL-SCNC: 3.6 MMOL/L (ref 3.5–5.1)
PROT SERPL-MCNC: 7.7 G/DL (ref 6.4–8.2)
PROT UR-MCNC: 20 MG/DL
RBC # BLD AUTO: 4.66 X10(6)UL
SODIUM SERPL-SCNC: 139 MMOL/L (ref 136–145)
SP GR UR STRIP: 1.03 (ref 1–1.03)
TRIGL SERPL-MCNC: 65 MG/DL (ref 30–149)
TSI SER-ACNC: 1.1 MIU/ML (ref 0.36–3.74)
UROBILINOGEN UR STRIP-ACNC: NORMAL
VLDLC SERPL CALC-MCNC: 11 MG/DL (ref 0–30)
WBC # BLD AUTO: 11.3 X10(3) UL (ref 4–11)

## 2023-05-09 PROCEDURE — 85025 COMPLETE CBC W/AUTO DIFF WBC: CPT

## 2023-05-09 PROCEDURE — 3061F NEG MICROALBUMINURIA REV: CPT | Performed by: INTERNAL MEDICINE

## 2023-05-09 PROCEDURE — 80053 COMPREHEN METABOLIC PANEL: CPT

## 2023-05-09 PROCEDURE — 81001 URINALYSIS AUTO W/SCOPE: CPT

## 2023-05-09 PROCEDURE — 82570 ASSAY OF URINE CREATININE: CPT

## 2023-05-09 PROCEDURE — 36415 COLL VENOUS BLD VENIPUNCTURE: CPT

## 2023-05-09 PROCEDURE — 83036 HEMOGLOBIN GLYCOSYLATED A1C: CPT

## 2023-05-09 PROCEDURE — 84443 ASSAY THYROID STIM HORMONE: CPT

## 2023-05-09 PROCEDURE — 82043 UR ALBUMIN QUANTITATIVE: CPT

## 2023-05-09 PROCEDURE — 80061 LIPID PANEL: CPT

## 2023-05-11 ENCOUNTER — OFFICE VISIT (OUTPATIENT)
Dept: INTERNAL MEDICINE CLINIC | Facility: CLINIC | Age: 63
End: 2023-05-11

## 2023-05-11 VITALS
SYSTOLIC BLOOD PRESSURE: 101 MMHG | HEIGHT: 63.4 IN | DIASTOLIC BLOOD PRESSURE: 66 MMHG | BODY MASS INDEX: 31.85 KG/M2 | OXYGEN SATURATION: 97 % | HEART RATE: 66 BPM | WEIGHT: 182 LBS

## 2023-05-11 DIAGNOSIS — R05.1 ACUTE COUGH: Primary | ICD-10-CM

## 2023-05-11 PROCEDURE — 3074F SYST BP LT 130 MM HG: CPT | Performed by: NURSE PRACTITIONER

## 2023-05-11 PROCEDURE — 99213 OFFICE O/P EST LOW 20 MIN: CPT | Performed by: NURSE PRACTITIONER

## 2023-05-11 PROCEDURE — 3008F BODY MASS INDEX DOCD: CPT | Performed by: NURSE PRACTITIONER

## 2023-05-11 PROCEDURE — 3078F DIAST BP <80 MM HG: CPT | Performed by: NURSE PRACTITIONER

## 2023-05-11 RX ORDER — FLUTICASONE PROPIONATE 50 MCG
2 SPRAY, SUSPENSION (ML) NASAL DAILY
Qty: 15.8 ML | Refills: 0 | Status: SHIPPED | OUTPATIENT
Start: 2023-05-11 | End: 2024-05-05

## 2023-05-11 RX ORDER — GUAIFENESIN AND CODEINE PHOSPHATE 100; 10 MG/5ML; MG/5ML
SOLUTION ORAL
Qty: 118 ML | Refills: 0 | Status: SHIPPED | OUTPATIENT
Start: 2023-05-11

## 2023-06-14 ENCOUNTER — HOSPITAL ENCOUNTER (OUTPATIENT)
Dept: MAMMOGRAPHY | Age: 63
Discharge: HOME | End: 2023-06-14
Attending: INTERNAL MEDICINE
Payer: MEDICAID

## 2023-06-14 DIAGNOSIS — Z12.31 BREAST CANCER SCREENING BY MAMMOGRAM: ICD-10-CM

## 2023-06-14 PROCEDURE — 77067 SCR MAMMO BI INCL CAD: CPT | Performed by: INTERNAL MEDICINE

## 2023-06-14 PROCEDURE — 77063 BREAST TOMOSYNTHESIS BI: CPT | Performed by: INTERNAL MEDICINE

## 2023-07-22 ENCOUNTER — LAB ENCOUNTER (OUTPATIENT)
Dept: LAB | Age: 63
End: 2023-07-22
Attending: INTERNAL MEDICINE
Payer: MEDICAID

## 2023-07-22 DIAGNOSIS — R89.9 ABNORMAL LABORATORY TEST: ICD-10-CM

## 2023-07-22 DIAGNOSIS — R82.90 ABNORMAL URINE: ICD-10-CM

## 2023-07-22 LAB
BASOPHILS # BLD AUTO: 0.02 X10(3) UL (ref 0–0.2)
BASOPHILS NFR BLD AUTO: 0.4 %
BILIRUB UR QL: NEGATIVE
CLARITY UR: CLEAR
DEPRECATED RDW RBC AUTO: 45.4 FL (ref 35.1–46.3)
EOSINOPHIL # BLD AUTO: 0.09 X10(3) UL (ref 0–0.7)
EOSINOPHIL NFR BLD AUTO: 1.7 %
ERYTHROCYTE [DISTWIDTH] IN BLOOD BY AUTOMATED COUNT: 14.3 % (ref 11–15)
GLUCOSE UR-MCNC: NORMAL MG/DL
HCT VFR BLD AUTO: 40.7 %
HGB BLD-MCNC: 12.8 G/DL
HGB UR QL STRIP.AUTO: NEGATIVE
IMM GRANULOCYTES # BLD AUTO: 0.01 X10(3) UL (ref 0–1)
IMM GRANULOCYTES NFR BLD: 0.2 %
KETONES UR-MCNC: NEGATIVE MG/DL
LEUKOCYTE ESTERASE UR QL STRIP.AUTO: NEGATIVE
LYMPHOCYTES # BLD AUTO: 1.91 X10(3) UL (ref 1–4)
LYMPHOCYTES NFR BLD AUTO: 36.7 %
MCH RBC QN AUTO: 27.2 PG (ref 26–34)
MCHC RBC AUTO-ENTMCNC: 31.4 G/DL (ref 31–37)
MCV RBC AUTO: 86.4 FL
MONOCYTES # BLD AUTO: 0.44 X10(3) UL (ref 0.1–1)
MONOCYTES NFR BLD AUTO: 8.5 %
NEUTROPHILS # BLD AUTO: 2.73 X10 (3) UL (ref 1.5–7.7)
NEUTROPHILS # BLD AUTO: 2.73 X10(3) UL (ref 1.5–7.7)
NEUTROPHILS NFR BLD AUTO: 52.5 %
NITRITE UR QL STRIP.AUTO: NEGATIVE
PH UR: 6.5 [PH] (ref 5–8)
PLATELET # BLD AUTO: 251 10(3)UL (ref 150–450)
PROT UR-MCNC: NEGATIVE MG/DL
RBC # BLD AUTO: 4.71 X10(6)UL
SP GR UR STRIP: 1.02 (ref 1–1.03)
UROBILINOGEN UR STRIP-ACNC: NORMAL
WBC # BLD AUTO: 5.2 X10(3) UL (ref 4–11)

## 2023-07-22 PROCEDURE — 85025 COMPLETE CBC W/AUTO DIFF WBC: CPT

## 2023-07-22 PROCEDURE — 36415 COLL VENOUS BLD VENIPUNCTURE: CPT

## 2023-07-25 ENCOUNTER — OFFICE VISIT (OUTPATIENT)
Dept: PHYSICAL MEDICINE AND REHAB | Facility: CLINIC | Age: 63
End: 2023-07-25
Payer: MEDICAID

## 2023-07-25 VITALS
BODY MASS INDEX: 32.65 KG/M2 | SYSTOLIC BLOOD PRESSURE: 120 MMHG | HEIGHT: 62.5 IN | WEIGHT: 182 LBS | DIASTOLIC BLOOD PRESSURE: 76 MMHG

## 2023-07-25 DIAGNOSIS — M16.12 PRIMARY OSTEOARTHRITIS OF LEFT HIP: ICD-10-CM

## 2023-07-25 DIAGNOSIS — G89.29 CHRONIC PAIN OF LEFT KNEE: ICD-10-CM

## 2023-07-25 DIAGNOSIS — M25.562 CHRONIC PAIN OF LEFT KNEE: ICD-10-CM

## 2023-07-25 DIAGNOSIS — M25.552 LEFT HIP PAIN: Primary | ICD-10-CM

## 2023-07-25 PROCEDURE — 3078F DIAST BP <80 MM HG: CPT | Performed by: PHYSICAL MEDICINE & REHABILITATION

## 2023-07-25 PROCEDURE — 3074F SYST BP LT 130 MM HG: CPT | Performed by: PHYSICAL MEDICINE & REHABILITATION

## 2023-07-25 PROCEDURE — 3008F BODY MASS INDEX DOCD: CPT | Performed by: PHYSICAL MEDICINE & REHABILITATION

## 2023-07-25 PROCEDURE — 99204 OFFICE O/P NEW MOD 45 MIN: CPT | Performed by: PHYSICAL MEDICINE & REHABILITATION

## 2023-07-25 NOTE — PATIENT INSTRUCTIONS
Get started in physical therapy working on your left knee and hip. See me back in about 6 weeks at which time we may consider an injection to your left hip if it still bothers you.

## 2023-07-25 NOTE — PROGRESS NOTES
NEW PATIENT VISIT    CHIEF COMPLAINT  Left hip pain, lateral and groin, chronic    HISTORY OF PRESENTING ILLNESS  Fiordaliza Leblanc is a 66year old female who presents for evaluation of Left lateral and groin hip pain. Currently rates her pain 8/10. Denies any numbness and tingling travel down the leg. She is referred to my office by her primary care physician Dr. Hamilton Keller. Pain has been present for the last 5 months or so. Notes pain in the groin as well as left lateral hip specifically with changing body positions and rising from a seated position. She denies significant pain when she walks or lays down. She feels that her gait is slightly off and has now been developing left-sided knee pain. She is now participated in physical therapy. X-rays taken in April are reviewed in full below. Currently takes Tylenol as needed. PAST MEDICAL HISTORY  Past Medical History:   Diagnosis Date    Erosive gastritis 3/7/2019    History of pregnancy ,     per     Internal hemorrhoids without complication 3/72/0969       PAST SURGICAL HISTORY  Past Surgical History:   Procedure Laterality Date          per           per     COLONOSCOPY N/A 8/10/2018    Procedure: COLONOSCOPY;  Surgeon: Branden Lamar MD;  Location: [address] [place] [address] ENDOSCOPY    HYSTERECTOMY      per        MEDICATIONS  guaiFENesin-Codeine 100-10 MG/5ML Oral Syrup, Take 1 tsp every 6 hours for cough as needed. May cause drowsiness, Disp: 118 mL, Rfl: 0  fluticasone propionate 50 MCG/ACT Nasal Suspension, 2 sprays by Each Nare route daily. , Disp: 15.8 mL, Rfl: 0  ibuprofen 800 MG Oral Tab, Take 1 tablet (800 mg total) by mouth every 6 (six) hours as needed. , Disp: , Rfl:   metFORMIN 500 MG Oral Tab, Take 1 tablet (500 mg total) by mouth daily with breakfast., Disp: 90 tablet, Rfl: 1  EMGALITY 120 MG/ML Subcutaneous Solution Auto-injector, 1 each daily. , Disp: , Rfl:   ergocalciferol 1.25 MG (78137 UT) Oral Cap, Vitamin D2 1,250 mcg (50,000 unit) capsule   TAKE 1 CAPSULE  BY MOUTH 1 DAY A WEEK., Disp: , Rfl:   diclofenac 1 % External Gel, Apply 4 g topically. , Disp: , Rfl:   albuterol 108 (90 Base) MCG/ACT Inhalation Aero Soln, As Directed., Disp: , Rfl:   ubrogepant (UBRELVY) 50 MG Oral Tab, Take 50 mg by mouth., Disp: , Rfl:   Mometasone Furoate 50 MCG/ACT Nasal Suspension, 2 sprays by Nasal route daily. In each nostril, Disp: 1 Bottle, Rfl: 1    No current facility-administered medications on file prior to visit. ALLERGIES  No Known Allergies    SOCIAL HISTORY   reports that she has never smoked. She has never used smokeless tobacco. She reports that she does not drink alcohol and does not use drugs. FAMILY HISTORY  Family History   Problem Relation Age of Onset    Blood Disorder Father         DVT; per NG    Diabetes Sister     Diabetes Brother     Glaucoma Neg     Macular degeneration Neg        REVIEW OF SYSTEMS  Complete review of systems was performed and was negative except for those items stated in the History of Presenting Illness and Past Medical/Surgical History. PHYSICAL EXAMINATION  GENERAL:  In no acute distress. Well-developed and well nourished. SKIN: No rashes or open wounds involving bilateral lower extremities, hip, posterior torso. NEUROLOGIC:   Strength: 5/5 bilaterally with hip flexion, knee extension, knee flexion, ankle dorsiflexion, ankle eversion, ankle inversion, ankle plantarflexion, and great toe extension. Sensation: intact light touch sensation throughout both lower extremities. Reflexes: intact and symmetric in bilateral lower extremities. Babinski downgoing bilaterally. No clonus. Gait: able to heel walk, toe walk, and perform tandem gait. MUSCULOSKELETAL:  Inspection: Normal alignment of lumbar spine. No shift or scoliosis. Normal posture. Equal iliac crest heights. No pelvic asymmetry.    Palpation: Mild tenderness to palpation over the left greater trochanter on the lower right, mild tenderness over the left gluteal muscles and anterior hip capsule  ROM: Active lumbar spine ROM is flexion and extension without exacerbation of pain. Passive right hip ROM is full and pain-free. Passive left hip ROM is slightly limited internal rotation and painful with internal and external rotation. Special Tests:   Slump sit: Negative, Supine SLR: Negative, Femoral Stretch: Negative   Logroll: Positive on the left, FADIR: Positive on the left, Hip Scour: Positive on the left, Stinchfield: Negative   Hip distraction: Negative   DERRICK/Simba's: Positive for groin pain on the left, Thigh thrust: Negative, sacral compression: Negative    REVIEW OF PRIOR X-RAYS/STUDIES  Independently reviewed the plain film radiographs of the left hip dated 4/25/2023 which reveal mild joint space narrowing. Consistent with mild osteoarthritic change. IMPRESSION/DIAGNOSIS  Left hip pain  (primary encounter diagnosis)  Primary osteoarthritis of left hip    Patient presenting with primarily left lateral and groin pain consistent with intra-articular pain generator. She also has some pain emanating from the left greater trochanter however this does not appear to be her main pain generator    TREATMENT/PLAN  She will get started in physical therapy working on left hip, left knee stabilization as well as generalized low back pelvic stabilization and joint strength. She will follow-up with me in about 6 weeks at which time she would very likely be a candidate for a left hip intra-articular corticosteroid injection with ultrasound guidance if her pain persist at that time. Education was provided regarding the above impression/diagnosis and treatment options/plan were discussed. All questions were answered during today's visit. Patient will contact the clinic if there are any other questions or concerns.         Haily Yoon DO  Physical Medicine and Rehabilitation / Sports Medicine [employer]

## 2023-08-02 ENCOUNTER — OFFICE VISIT (OUTPATIENT)
Dept: INTERNAL MEDICINE CLINIC | Facility: CLINIC | Age: 63
End: 2023-08-02

## 2023-08-02 ENCOUNTER — LAB ENCOUNTER (OUTPATIENT)
Dept: LAB | Age: 63
End: 2023-08-02
Attending: INTERNAL MEDICINE
Payer: MEDICAID

## 2023-08-02 VITALS
HEART RATE: 64 BPM | HEIGHT: 63 IN | BODY MASS INDEX: 31.13 KG/M2 | DIASTOLIC BLOOD PRESSURE: 83 MMHG | RESPIRATION RATE: 18 BRPM | SYSTOLIC BLOOD PRESSURE: 147 MMHG | WEIGHT: 175.69 LBS

## 2023-08-02 DIAGNOSIS — E11.9 TYPE 2 DIABETES MELLITUS WITHOUT COMPLICATION, WITHOUT LONG-TERM CURRENT USE OF INSULIN (HCC): Primary | ICD-10-CM

## 2023-08-02 DIAGNOSIS — J02.8 PHARYNGITIS DUE TO OTHER ORGANISM: ICD-10-CM

## 2023-08-02 LAB
ALBUMIN SERPL-MCNC: 4 G/DL (ref 3.4–5)
ALBUMIN/GLOB SERPL: 1 {RATIO} (ref 1–2)
ALP LIVER SERPL-CCNC: 68 U/L
ALT SERPL-CCNC: 22 U/L
ANION GAP SERPL CALC-SCNC: 4 MMOL/L (ref 0–18)
AST SERPL-CCNC: 16 U/L (ref 15–37)
BASOPHILS # BLD AUTO: 0.03 X10(3) UL (ref 0–0.2)
BASOPHILS NFR BLD AUTO: 0.5 %
BILIRUB SERPL-MCNC: 0.6 MG/DL (ref 0.1–2)
BUN BLD-MCNC: 12 MG/DL (ref 7–18)
BUN/CREAT SERPL: 16.7 (ref 10–20)
CALCIUM BLD-MCNC: 9.6 MG/DL (ref 8.5–10.1)
CHLORIDE SERPL-SCNC: 107 MMOL/L (ref 98–112)
CO2 SERPL-SCNC: 28 MMOL/L (ref 21–32)
CREAT BLD-MCNC: 0.72 MG/DL
DEPRECATED RDW RBC AUTO: 43.8 FL (ref 35.1–46.3)
EGFRCR SERPLBLD CKD-EPI 2021: 95 ML/MIN/1.73M2 (ref 60–?)
EOSINOPHIL # BLD AUTO: 0.09 X10(3) UL (ref 0–0.7)
EOSINOPHIL NFR BLD AUTO: 1.6 %
ERYTHROCYTE [DISTWIDTH] IN BLOOD BY AUTOMATED COUNT: 14.3 % (ref 11–15)
EST. AVERAGE GLUCOSE BLD GHB EST-MCNC: 137 MG/DL (ref 68–126)
FASTING STATUS PATIENT QL REPORTED: NO
GLOBULIN PLAS-MCNC: 3.9 G/DL (ref 2.8–4.4)
GLUCOSE BLD-MCNC: 104 MG/DL (ref 70–99)
HBA1C MFR BLD: 6.4 % (ref ?–5.7)
HCT VFR BLD AUTO: 42.6 %
HGB BLD-MCNC: 13.5 G/DL
IMM GRANULOCYTES # BLD AUTO: 0.01 X10(3) UL (ref 0–1)
IMM GRANULOCYTES NFR BLD: 0.2 %
LYMPHOCYTES # BLD AUTO: 2.01 X10(3) UL (ref 1–4)
LYMPHOCYTES NFR BLD AUTO: 36.3 %
MCH RBC QN AUTO: 27.1 PG (ref 26–34)
MCHC RBC AUTO-ENTMCNC: 31.7 G/DL (ref 31–37)
MCV RBC AUTO: 85.5 FL
MONOCYTES # BLD AUTO: 0.38 X10(3) UL (ref 0.1–1)
MONOCYTES NFR BLD AUTO: 6.9 %
NEUTROPHILS # BLD AUTO: 3.02 X10 (3) UL (ref 1.5–7.7)
NEUTROPHILS # BLD AUTO: 3.02 X10(3) UL (ref 1.5–7.7)
NEUTROPHILS NFR BLD AUTO: 54.5 %
OSMOLALITY SERPL CALC.SUM OF ELEC: 288 MOSM/KG (ref 275–295)
PLATELET # BLD AUTO: 260 10(3)UL (ref 150–450)
POTASSIUM SERPL-SCNC: 4 MMOL/L (ref 3.5–5.1)
PROT SERPL-MCNC: 7.9 G/DL (ref 6.4–8.2)
RBC # BLD AUTO: 4.98 X10(6)UL
SODIUM SERPL-SCNC: 139 MMOL/L (ref 136–145)
WBC # BLD AUTO: 5.5 X10(3) UL (ref 4–11)

## 2023-08-02 PROCEDURE — 3044F HG A1C LEVEL LT 7.0%: CPT | Performed by: INTERNAL MEDICINE

## 2023-08-02 PROCEDURE — 85025 COMPLETE CBC W/AUTO DIFF WBC: CPT

## 2023-08-02 PROCEDURE — 99213 OFFICE O/P EST LOW 20 MIN: CPT | Performed by: INTERNAL MEDICINE

## 2023-08-02 PROCEDURE — 3077F SYST BP >= 140 MM HG: CPT | Performed by: INTERNAL MEDICINE

## 2023-08-02 PROCEDURE — 83036 HEMOGLOBIN GLYCOSYLATED A1C: CPT

## 2023-08-02 PROCEDURE — 80053 COMPREHEN METABOLIC PANEL: CPT

## 2023-08-02 PROCEDURE — 36415 COLL VENOUS BLD VENIPUNCTURE: CPT

## 2023-08-02 PROCEDURE — 3079F DIAST BP 80-89 MM HG: CPT | Performed by: INTERNAL MEDICINE

## 2023-08-02 PROCEDURE — 3008F BODY MASS INDEX DOCD: CPT | Performed by: INTERNAL MEDICINE

## 2023-08-02 RX ORDER — BLOOD SUGAR DIAGNOSTIC
1 STRIP MISCELLANEOUS DAILY
Qty: 100 STRIP | Refills: 3 | Status: SHIPPED | OUTPATIENT
Start: 2023-08-02

## 2023-08-02 RX ORDER — LANCETS
EACH MISCELLANEOUS
Qty: 100 EACH | Refills: 3 | Status: SHIPPED | OUTPATIENT
Start: 2023-08-02

## 2023-08-02 RX ORDER — BLOOD GLUCOSE CONTROL HIGH,LOW
1 EACH MISCELLANEOUS DAILY
Qty: 1 EACH | Refills: 2 | Status: SHIPPED | OUTPATIENT
Start: 2023-08-02

## 2023-08-02 RX ORDER — BLOOD-GLUCOSE METER
1 EACH MISCELLANEOUS DAILY
Qty: 1 KIT | Refills: 0 | Status: SHIPPED | OUTPATIENT
Start: 2023-08-02

## 2023-08-04 PROBLEM — R73.03 PREDIABETES: Status: ACTIVE | Noted: 2023-08-04

## 2023-08-23 ENCOUNTER — OFFICE VISIT (OUTPATIENT)
Dept: PHYSICAL THERAPY | Age: 63
End: 2023-08-23
Attending: PHYSICAL MEDICINE & REHABILITATION
Payer: MEDICAID

## 2023-08-23 DIAGNOSIS — M25.562 CHRONIC PAIN OF LEFT KNEE: ICD-10-CM

## 2023-08-23 DIAGNOSIS — G89.29 CHRONIC PAIN OF LEFT KNEE: ICD-10-CM

## 2023-08-23 DIAGNOSIS — M25.552 LEFT HIP PAIN: Primary | ICD-10-CM

## 2023-08-23 DIAGNOSIS — M16.12 PRIMARY OSTEOARTHRITIS OF LEFT HIP: ICD-10-CM

## 2023-08-23 PROCEDURE — 97162 PT EVAL MOD COMPLEX 30 MIN: CPT

## 2023-08-23 PROCEDURE — 97110 THERAPEUTIC EXERCISES: CPT

## 2023-08-28 ENCOUNTER — TELEPHONE (OUTPATIENT)
Dept: PHYSICAL THERAPY | Facility: HOSPITAL | Age: 63
End: 2023-08-28

## 2023-08-30 ENCOUNTER — APPOINTMENT (OUTPATIENT)
Dept: PHYSICAL THERAPY | Age: 63
End: 2023-08-30
Attending: PHYSICAL MEDICINE & REHABILITATION
Payer: MEDICAID

## 2023-09-06 ENCOUNTER — OFFICE VISIT (OUTPATIENT)
Dept: PHYSICAL THERAPY | Age: 63
End: 2023-09-06
Attending: PHYSICAL MEDICINE & REHABILITATION
Payer: MEDICAID

## 2023-09-06 PROCEDURE — 97140 MANUAL THERAPY 1/> REGIONS: CPT

## 2023-09-06 PROCEDURE — 97110 THERAPEUTIC EXERCISES: CPT

## 2023-09-06 PROCEDURE — 97112 NEUROMUSCULAR REEDUCATION: CPT

## 2023-09-06 NOTE — PROGRESS NOTES
Diagnosis:   Left hip pain M25.552, Primary osteoarthritis of left hip M16.12, Chronic pain of left knee M25.562, Low back pain M54.50      Referring Provider: Le Parker  Date of Evaluation:    8/23/23    Precautions:  None Next MD visit:   none scheduled  Date of Surgery: n/a   Insurance Primary/Secondary: BLUE CROSS MEDICAID / N/A     # Auth Visits: 2/10            Subjective: Pt reports increased pain in back and knee since 2 weeks. Pt reports completing HEP, but that they slightly made her condition worse. Her  advised she stop, but she did not. Pt reports she is able to walk for an hour without increased c/o. Pain: 6-7/10 back and knee. Objective: See table for program.  Reviewed HEP issued at [facility]: piriformis and supine hamstring stretching. Educated pt in importance of gentle stretching, and not causing pain. Pt instructed to stop exercise if unable to modify so that a gentle stretch is felt without pain. Eliminated piriformis stretch due to inability to feel in correct spot. Assessment: Pt with difficulty completing exercises correctly, tending to push until painful, resulting in additional/increased pain. Goals:   (to be met in 12 visits)  Decrease pain to 1/10 at worst to assist with ADLs. Pt to perform all lumbar AROM pain free to enable reaching items. Pt to perform all hip AROM pain free to enable negotiating uneven surfaces. Increase hip ABD strength to 5/5 to enable return to prolonged activities. Pt to be able to perform 3x10 chair squats with good eccentric control to enable chair transfers. Increase 30 sec sit to stand test to 11 or > to enable squatting. Improve LEFS to 53% or > to enable return to prior functional level. Pt to be independent with HEP for ongoing management. Plan: Advance gentle stretching and strengthening, posture.   Date: 9/6/2023  TX#: 2/10 Date:                 TX#: 3/ Date:                 TX#: 4/ Date:                 TX#: 5/ Date: Tx#: 6/   TE: 23 min  Hamstring stretch 30 sec x 3.   SKTC 10 sec x 5 L  Bridging x 2/10  Isometric add 3 sec hold x 10  Clams x 8 reps       MM: 8 min  STM/TPR to Left piriformis         NM: 8 min  Nustep seat 6, lv5, 5 min  Chair squats x 10 (mat table)                HEP: clams, bridge, chair squats    Charges: TE2, NM, MM       Total Timed Treatment: 39 min  Total Treatment Time: 41 min

## 2023-09-11 ENCOUNTER — OFFICE VISIT (OUTPATIENT)
Dept: PHYSICAL THERAPY | Age: 63
End: 2023-09-11
Attending: PHYSICAL MEDICINE & REHABILITATION
Payer: MEDICAID

## 2023-09-11 PROCEDURE — 97110 THERAPEUTIC EXERCISES: CPT

## 2023-09-11 PROCEDURE — 97140 MANUAL THERAPY 1/> REGIONS: CPT

## 2023-09-11 PROCEDURE — 97112 NEUROMUSCULAR REEDUCATION: CPT

## 2023-09-11 NOTE — PROGRESS NOTES
Diagnosis:   Left hip pain M25.552, Primary osteoarthritis of left hip M16.12, Chronic pain of left knee M25.562, Low back pain M54.50      Referring Provider: Maliha Perez  Date of Evaluation:    8/23/23    Precautions:  None Next MD visit:   none scheduled  Date of Surgery: n/a   Insurance Primary/Secondary: BLUE CROSS MEDICAID / N/A     # Auth Visits: 3/10          Pt 10 min late to appt  Subjective: Pt reports she has the same level of pain, but feels better. Pain: 6/10 back and knee. Objective: See table for program.    Educated pt in importance of gentle stretching, and not causing pain. Pt instructed to stop exercise if unable to modify so that a gentle stretch is felt without pain. Eliminated piriformis stretch due to inability to feel in correct spot. Assessment: Good effort and improved completion of exercises with good technique. Palpable spasm of left piriformis noted. Goals:   (to be met in 12 visits)  Decrease pain to 1/10 at worst to assist with ADLs. Pt to perform all lumbar AROM pain free to enable reaching items. Pt to perform all hip AROM pain free to enable negotiating uneven surfaces. Increase hip ABD strength to 5/5 to enable return to prolonged activities. Pt to be able to perform 3x10 chair squats with good eccentric control to enable chair transfers. Increase 30 sec sit to stand test to 11 or > to enable squatting. Improve LEFS to 53% or > to enable return to prior functional level. Pt to be independent with HEP for ongoing management. Plan: Advance gentle stretching and strengthening, posture. Date: 9/6/2023  TX#: 2/10 Date:   9/11/23              TX#: 3/10 Date:                 TX#: 4/ Date:                 TX#: 5/ Date: Tx#: 6/   TE: 23 min  Hamstring stretch 30 sec x 3. SKTC 10 sec x 5 L  Bridging x 2/10  Isometric add 3 sec hold x 10  Clams x 8 reps TE: 15 min  Hamstring stretch 30 sec x 3.   Piriformis stretch across to opp shoulder 10 sec x 10  Bridging with isometric add x 2/10  Isometric add 3 sec hold x 10  Clams x 2/10 reps  Sidelying hip abd x 9 reps L  Shuttle squats 6 bands, B x 20  Shuttle squats R/L 5 bands 2/10  SLS L, hip abd x 10 B   MM: 10 min  STM/TPR to Left piriformis     NM: 8 min  Nustep seat 6, lv5, 5 min  Chair squats x 10 (mat table)             MM: 8 min  STM/TPR to Left piriformis         NM: 8 min  Nustep seat 6, lv5, 5 min  Chair squats x 10 (mat table)                HEP: clams, bridge, chair squats    Charges: TE, NM, MM       Total Timed Treatment: 33 min  Total Treatment Time: 35 min

## 2023-09-13 ENCOUNTER — APPOINTMENT (OUTPATIENT)
Dept: PHYSICAL THERAPY | Age: 63
End: 2023-09-13
Attending: PHYSICAL MEDICINE & REHABILITATION
Payer: MEDICAID

## 2023-09-20 ENCOUNTER — APPOINTMENT (OUTPATIENT)
Dept: PHYSICAL THERAPY | Age: 63
End: 2023-09-20
Attending: PHYSICAL MEDICINE & REHABILITATION
Payer: MEDICAID

## 2023-09-25 ENCOUNTER — OFFICE VISIT (OUTPATIENT)
Dept: PHYSICAL THERAPY | Age: 63
End: 2023-09-25
Attending: PHYSICAL MEDICINE & REHABILITATION
Payer: MEDICAID

## 2023-09-25 PROCEDURE — 97112 NEUROMUSCULAR REEDUCATION: CPT

## 2023-09-25 PROCEDURE — 97140 MANUAL THERAPY 1/> REGIONS: CPT

## 2023-09-25 PROCEDURE — 97110 THERAPEUTIC EXERCISES: CPT

## 2023-09-25 NOTE — PROGRESS NOTES
Diagnosis:   Left hip pain M25.552, Primary osteoarthritis of left hip M16.12, Chronic pain of left knee M25.562, Low back pain M54.50      Referring Provider: Le Parker  Date of Evaluation:    8/23/23    Precautions:  None Next MD visit:   none scheduled  Date of Surgery: n/a   Insurance Primary/Secondary: BLUE CROSS MEDICAID / N/A     # Auth Visits: 4/10            Subjective: Pt reports that she feels a little bit better, but still has 6/10 pain. Pt reports improvement with sit to stand, but continues to have significant difficulty with bending over in kitchen to get into lower cabinets. Pain: 6/10 back and knee. Objective: See table for program.        Assessment: Palpable spasm of left piriformis noted. STM to reduce spasm. Consistent VC/TC required for good completion of HEP. Goals:   (to be met in 12 visits)  Decrease pain to 1/10 at worst to assist with ADLs. Pt to perform all lumbar AROM pain free to enable reaching items. Pt to perform all hip AROM pain free to enable negotiating uneven surfaces. Increase hip ABD strength to 5/5 to enable return to prolonged activities. Pt to be able to perform 3x10 chair squats with good eccentric control to enable chair transfers. Increase 30 sec sit to stand test to 11 or > to enable squatting. Improve LEFS to 53% or > to enable return to prior functional level. Pt to be independent with HEP for ongoing management. Plan: Advance gentle stretching and strengthening, posture. Date: 9/6/2023  TX#: 2/10 Date:   9/11/23              TX#: 3/10 Date: 9/25/23                TX#: 4/10 Date:                 TX#: 5/ Date: Tx#: 6/   TE: 23 min  Hamstring stretch 30 sec x 3. SKTC 10 sec x 5 L  Bridging x 2/10  Isometric add 3 sec hold x 10  Clams x 8 reps TE: 15 min  Hamstring stretch 30 sec x 3.   Piriformis stretch across to opp shoulder 10 sec x 10  Bridging with isometric add x 2/10  Isometric add 3 sec hold x 10  Clams x 2/10 reps  Sidelying hip abd x 9 reps L  Shuttle squats 6 bands, B x 20  Shuttle squats R/L 5 bands 2/10  SLS L, hip abd x 10 B   MM: 10 min  STM/TPR to Left piriformis     NM: 8 min  Nustep seat 6, lv5, 5 min  Chair squats x 10 (mat table)        TE: 20 min  Clams x 2/10 reps  Sidelying hip abd x 2/5 reps L  Shuttle squats 7 bands, B x 2/20  Shuttle squats R/L 4 bands 2/10  SLS L, hip abd x 2/10 B  Sidestepping along railing   MM: 10 min  STM/TPR to Left piriformis     NM: 8 min  Nustep seat 6, lv5, 5 min  5 inch ant step ups x 10 B            MM: 8 min  STM/TPR to Left piriformis         NM: 8 min  Nustep seat 6, lv5, 5 min  Chair squats x 10 (mat table)                HEP: Standing hip abd, step ups, side stepping.     Charges: TE, NM, MM       Total Timed Treatment: 38 min  Total Treatment Time: 40 min

## 2023-09-27 ENCOUNTER — APPOINTMENT (OUTPATIENT)
Dept: PHYSICAL THERAPY | Age: 63
End: 2023-09-27
Attending: PHYSICAL MEDICINE & REHABILITATION
Payer: MEDICAID

## 2023-10-02 ENCOUNTER — OFFICE VISIT (OUTPATIENT)
Dept: PHYSICAL THERAPY | Age: 63
End: 2023-10-02
Attending: PHYSICAL MEDICINE & REHABILITATION
Payer: MEDICAID

## 2023-10-02 PROCEDURE — 97110 THERAPEUTIC EXERCISES: CPT

## 2023-10-02 PROCEDURE — 97112 NEUROMUSCULAR REEDUCATION: CPT

## 2023-10-02 NOTE — PROGRESS NOTES
Diagnosis:   Left hip pain M25.552, Primary osteoarthritis of left hip M16.12, Chronic pain of left knee M25.562, Low back pain M54.50      Referring Provider: Lisa Dawson  Date of Evaluation:    8/23/23    Precautions:  None Next MD visit:   none scheduled  Date of Surgery: n/a   Insurance Primary/Secondary: BLUE CROSS MEDICAID / N/A     # Auth Visits: 5/10            Subjective: Pt reports that when she is up moving around she feels better, but it is very difficult to get up from the floor. Pt reports completing HEP 1-2x/day. Pt only able to get up after sitting 15 minutes max. Any     Pain: 5/10 back to left knee. Objective: See table for program.        Assessment:  Consistent VC/TC required for good completion of HEP. Weakness of LLE remains biggest contributing factor to limitations and pain at this time. Goals:   (to be met in 12 visits)  Decrease pain to 1/10 at worst to assist with ADLs. Pt to perform all lumbar AROM pain free to enable reaching items. Pt to perform all hip AROM pain free to enable negotiating uneven surfaces. Increase hip ABD strength to 5/5 to enable return to prolonged activities. Pt to be able to perform 3x10 chair squats with good eccentric control to enable chair transfers. Increase 30 sec sit to stand test to 11 or > to enable squatting. Improve LEFS to 53% or > to enable return to prior functional level. Pt to be independent with HEP for ongoing management. Pt tolerates floor transfer with min c/o at worst.  Pt tolerates sitting 30 minutes and is able to stand up from sitting. Plan: Advance gentle stretching and strengthening, posture. Date: 9/6/2023  TX#: 2/10 Date:   9/11/23              TX#: 3/10 Date: 9/25/23                TX#: 4/10 Date: 10/2/23                TX#: 5/10 Date: Tx#:    TE: 23 min  Hamstring stretch 30 sec x 3.   SKTC 10 sec x 5 L  Bridging x 2/10  Isometric add 3 sec hold x 10  Clams x 8 reps TE: 15 min  Hamstring stretch 30 sec x 3.  Piriformis stretch across to opp shoulder 10 sec x 10  Bridging with isometric add x 2/10  Isometric add 3 sec hold x 10  Clams x 2/10 reps  Sidelying hip abd x 9 reps L  Shuttle squats 6 bands, B x 20  Shuttle squats R/L 5 bands 2/10  SLS L, hip abd x 10 B   MM: 10 min  STM/TPR to Left piriformis     NM: 8 min  Nustep seat 6, lv5, 5 min  Chair squats x 10 (mat table)        TE: 20 min  Clams x 2/10 reps  Sidelying hip abd x 2/5 reps L  Shuttle squats 7 bands, B x 2/20  Shuttle squats R/L 4 bands 2/10  SLS L, hip abd x 2/10 B  Sidestepping along railing   MM: 10 min  STM/TPR to Left piriformis     NM: 8 min  Nustep seat 6, lv5, 5 min  5 inch ant step ups x 10 B        TE: 30 min  Clams x 2/10 reps  Sidelying hip abd x 2/5 reps L  Shuttle squats 6 bands, B x 2/20  Shuttle squats R/L 4.5 bands 2/15  SLS L, hip abd x 2/10 B  Sidestepping along railing  SL balance 3 cone reach x 5 B  KTC seated 10 sec hold x 5 B  Seated piriformis stretch 10 sec x 5 B. NM: 10 min  Nustep seat 6, lv5, 5 min  5 inch ant step ups x 10 B  5 inch step ups lateral x 10 B           MM: 8 min  STM/TPR to Left piriformis         NM: 8 min  Nustep seat 6, lv5, 5 min  Chair squats x 10 (mat table)                HEP: Standing hip abd, step ups, side stepping.     Charges: TE2, NM       Total Timed Treatment: 40 min  Total Treatment Time: 40 min

## 2023-10-04 ENCOUNTER — APPOINTMENT (OUTPATIENT)
Dept: PHYSICAL THERAPY | Age: 63
End: 2023-10-04
Attending: PHYSICAL MEDICINE & REHABILITATION
Payer: MEDICAID

## 2023-10-09 ENCOUNTER — OFFICE VISIT (OUTPATIENT)
Dept: PHYSICAL THERAPY | Age: 63
End: 2023-10-09
Attending: PHYSICAL MEDICINE & REHABILITATION
Payer: MEDICAID

## 2023-10-09 PROCEDURE — 97112 NEUROMUSCULAR REEDUCATION: CPT

## 2023-10-09 PROCEDURE — 97110 THERAPEUTIC EXERCISES: CPT

## 2023-10-09 NOTE — PROGRESS NOTES
Diagnosis:   Left hip pain M25.552, Primary osteoarthritis of left hip M16.12, Chronic pain of left knee M25.562, Low back pain M54.50      Referring Provider: Conner Mcqueen  Date of Evaluation:    8/23/23    Precautions:  None Next MD visit:   none scheduled  Date of Surgery: n/a   Insurance Primary/Secondary: BLUE CROSS MEDICAID / N/A     # Auth Visits: 6/10            Subjective: Pt reports that her pain is improving, but still has difficulty getting on/off the floor or walk 30 minutes or more . \"There are times when I forget my pain. \" Per pt    Pain: 2-3/10 back       Objective: See table for program.        Assessment:  Weakness of LLE remains biggest contributing factor to limitations and pain at this time. Palpable spasm and TTP left piriformis. Goals:   (to be met in 12 visits)  Decrease pain to 1/10 at worst to assist with ADLs. Pt to perform all lumbar AROM pain free to enable reaching items. Pt to perform all hip AROM pain free to enable negotiating uneven surfaces. Increase hip ABD strength to 5/5 to enable return to prolonged activities. Pt to be able to perform 3x10 chair squats with good eccentric control to enable chair transfers. Increase 30 sec sit to stand test to 11 or > to enable squatting. Improve LEFS to 53% or > to enable return to prior functional level. Pt to be independent with HEP for ongoing management. Pt tolerates floor transfer with min c/o at worst.  Pt tolerates sitting 30 minutes and is able to stand up from sitting. Plan: Advance gentle stretching and strengthening, posture. Date: 10/2/23                TX#: 5/10 Date: 10/9/23  Tx#: 6/10   TE: 30 min  Clams x 2/10 reps  Sidelying hip abd x 2/5 reps L  Shuttle squats 6 bands, B x 2/20  Shuttle squats R/L 4.5 bands 2/15  SLS L, hip abd x 2/10 B  Sidestepping along railing  SL balance 3 cone reach x 5 B  KTC seated 10 sec hold x 5 B  Seated piriformis stretch 10 sec x 5 B.         NM: 10 min  Nustep seat 6, lv5, 5 min  5 inch ant step ups x 10 B  5 inch step ups lateral x 10 B        TE: 30 min  Clams x 2/10 reps  Sidelying hip abd x 2/5 reps L  Shuttle squats 6 bands, B x 2/20  Shuttle squats R/L 4.5 bands 2/15  SLS L, hip abd x 2/10 B  Sidestepping along railing  SL balance 3 cone reach x 10 B  KTC seated 10 sec hold x 5 B  Seated piriformis stretch 10 sec x 5 B. NM: 10 min  Nustep seat 6, lv5, 5 min  5 inch ant step ups x 20 B  5 inch step ups lateral x 20 B                      HEP: Standing hip abd, step ups, side stepping.     Charges: TE2, NM       Total Timed Treatment: 40 min  Total Treatment Time: 40 min

## 2023-10-11 ENCOUNTER — APPOINTMENT (OUTPATIENT)
Dept: PHYSICAL THERAPY | Age: 63
End: 2023-10-11
Attending: PHYSICAL MEDICINE & REHABILITATION
Payer: MEDICAID

## 2023-10-26 ENCOUNTER — APPOINTMENT (OUTPATIENT)
Dept: PHYSICAL THERAPY | Age: 63
End: 2023-10-26
Attending: PHYSICAL MEDICINE & REHABILITATION
Payer: MEDICAID

## 2023-10-27 ENCOUNTER — TELEPHONE (OUTPATIENT)
Dept: INTERNAL MEDICINE CLINIC | Facility: CLINIC | Age: 63
End: 2023-10-27

## 2023-10-27 DIAGNOSIS — R73.03 PREDIABETES: Primary | ICD-10-CM

## 2023-10-27 DIAGNOSIS — E11.9 TYPE 2 DIABETES MELLITUS WITHOUT COMPLICATION, WITHOUT LONG-TERM CURRENT USE OF INSULIN (HCC): ICD-10-CM

## 2023-10-27 RX ORDER — LANCETS
EACH MISCELLANEOUS
Qty: 100 EACH | Refills: 3 | Status: SHIPPED | OUTPATIENT
Start: 2023-10-27

## 2023-10-27 RX ORDER — BLOOD SUGAR DIAGNOSTIC
1 STRIP MISCELLANEOUS DAILY
Qty: 100 STRIP | Refills: 3 | Status: SHIPPED | OUTPATIENT
Start: 2023-10-27

## 2023-10-27 NOTE — TELEPHONE ENCOUNTER
Patient is not requesting glucose monitor. Patient would like another order for test strips and lancets. Patient said they use [facility]. Thank you.

## 2023-10-27 NOTE — TELEPHONE ENCOUNTER
Spouse is requesting a prior authorization for the a new Glucose monitor and all testing supplies including testing strips, and lancets

## 2023-10-28 NOTE — TELEPHONE ENCOUNTER
I sent requested supply to pharmacy, computer popout told me that it requires prior authorization, please figure out what needs to be done if your insurance changed so it means that she may need new meter, strips and lancets to match.   We do not have to do prior fertilization if its not covered just provide patient with supply which is on formulary with patient insurance

## 2023-10-30 ENCOUNTER — OFFICE VISIT (OUTPATIENT)
Dept: PHYSICAL THERAPY | Age: 63
End: 2023-10-30
Attending: PHYSICAL MEDICINE & REHABILITATION
Payer: MEDICAID

## 2023-10-30 PROCEDURE — 97112 NEUROMUSCULAR REEDUCATION: CPT

## 2023-10-30 PROCEDURE — 97110 THERAPEUTIC EXERCISES: CPT

## 2023-10-30 NOTE — PROGRESS NOTES
Diagnosis:   Left hip pain M25.552, Primary osteoarthritis of left hip M16.12, Chronic pain of left knee M25.562, Low back pain M54.50      Referring Provider: Aramis Royal  Date of Evaluation:    8/23/23    Precautions:  None Next MD visit:   none scheduled  Date of Surgery: n/a   Insurance Primary/Secondary: BLUE CROSS MEDICAID / N/A     # Auth Visits: 7/10          Progress note:  Subjective: Pt reports being able to get up from the floor with increased ease. Pt adds she can pray and get up with minimal pain, where as she experienced significant discomfort previously. Pain: 3/10 back       Objective: See table for program.  Strength/MMT: (* denotes performed with pain)  Hip   Flexion: R 5/5; L 5-/5  Extension: R 5/5; L 5/5  Abduction: R 5-/5; L 4/5  ER: R 5/5; L 5/5  IR: R 5/5; L 5/5         Assessment:  Weakness of LLE remains biggest contributing factor to limitations and pain at this time. Reduced spasm and TTP left piriformis, improved strength evidenced by improved functional abilities with reduced pain such as floor transfers. Reduced endurance noted this visit, possibly due to pt being out of town for several weeks. Goals:   (to be met in 12 visits)  Decrease pain to 1/10 at worst to assist with ADLs. - in progress  Pt to perform all lumbar AROM pain free to enable reaching items. - met  Pt to perform all hip AROM pain free to enable negotiating uneven surfaces.-met  Increase hip ABD strength to 5/5 to enable return to prolonged activities. - in progress   Pt to be able to perform 3x10 chair squats with good eccentric control to enable chair transfers.- met  Increase 30 sec sit to stand test to 11 or > to enable squatting.- x 9 reps, in progress  Improve LEFS to 53% or > to enable return to prior functional level. NT  Pt to be independent with HEP for ongoing management.  - met  Pt tolerates floor transfer with min c/o at worst.- met  Pt tolerates sitting 30 minutes and is able to stand up from sitting.- in progress    Plan: Advance gentle stretching and strengthening, posture. Date: 10/2/23                TX#: 5/10 Date: 10/9/23  Tx#: 6/10 Date: 10/30/23  Tx#: 7/10   TE: 30 min  Clams x 2/10 reps  Sidelying hip abd x 2/5 reps L  Shuttle squats 6 bands, B x 2/20  Shuttle squats R/L 4.5 bands 2/15  SLS L, hip abd x 2/10 B  Sidestepping along railing  SL balance 3 cone reach x 5 B  KTC seated 10 sec hold x 5 B  Seated piriformis stretch 10 sec x 5 B. NM: 10 min  Nustep seat 6, lv5, 5 min  5 inch ant step ups x 10 B  5 inch step ups lateral x 10 B        TE: 30 min  Clams x 2/10 reps  Sidelying hip abd x 2/5 reps L  Shuttle squats 6 bands, B x 2/20  Shuttle squats R/L 4.5 bands 2/15  SLS L, hip abd x 2/10 B  Sidestepping along railing  SL balance 3 cone reach x 10 B  KTC seated 10 sec hold x 5 B  Seated piriformis stretch 10 sec x 5 B. NM: 10 min  Nustep seat 6, lv5, 5 min  5 inch ant step ups x 20 B  5 inch step ups lateral x 20 B        TE: 30 min  Clams x 2/10 reps  Sidelying hip abd x 2/6 reps L  Shuttle squats 7 bands, B x 2/10  Shuttle squats R/L 5 bands 2/15  SLS L, hip abd x 10 B  Sidestepping along railing with YTB x 2 laps  SL balance 3 cone reach x 10 B  KTC seated 10 sec hold x 5 B  Seated piriformis stretch 10 sec x 5 B. NM: 10 min  Nustep seat 6, lv5, 5 min  5 inch ant step ups x 20 B  5 inch step ups lateral x 20 B                         HEP: Standing hip abd, step ups, side stepping.     Charges: TE2, NM       Total Timed Treatment: 40 min  Total Treatment Time: 40 min

## 2023-10-31 NOTE — TELEPHONE ENCOUNTER
Refill passed per [place] [employer] protocol. Please advise-  Pended [employer] supplies covered by [facility]           Requested Prescriptions   Pending Prescriptions Disp Refills    Glucose Blood (ONETOUCH VERIO) In Vitro Strip 100 strip 3     Sig: Check blood sugars one time a day       Diabetic Supplies Protocol Passed - 10/31/2023  8:37 AM        Passed - In person appointment or virtual visit in the past 12 mos or appointment in next 3 mos     Recent Outpatient Visits              [facility] in [facility], [facility] Visit    3 weeks ago     [address] [place] [address] in [facility] 18    4 weeks ago     [address] [place] [address] in [facility] 18    1 month ago     [address] [place] [address] in [facility] 18    1 month ago     [address] [place] [address] in [facility], [facility] Visit          Future Appointments         Provider Department Appt Notes    In 1 week [address] in [place] 2 visits 10/30 to   [address]  [id number]    In 2 months Lulú Kern MD [address], [address] [facility],[address], [place] NP DM EE                        Lancet Devices (ONETOUCH DELICA PLUS LANCING) Does not apply Misc 100 each 3     Si Act daily.  Dispense what is covered by insurance E11.9       Diabetic Supplies Protocol Passed - 10/31/2023  8:37 AM        Passed - In person appointment or virtual visit in the past 12 mos or appointment in next 3 mos     Recent Outpatient Visits              [address] [facility] in [facility], [facility] Visit    3 weeks ago     [address] [place] [address] in [facility] 18    4 weeks ago     [address] [place] [address] in [facility] 18    1 month ago     [address] [place] [address] in [place], [place] Office Visit    1 month ago     [employer] in [place], [place]    Office Visit          Future Appointments         Provider Department Appt Notes    In 1 week Doris Standard, [address] in [place] 2 visits 10/30 to 12/29  [facility]  [id number]    In 2 months Eleno Chi MD [address], [address], [address] NP DM EE                         Recent Outpatient Visits              [facility] in [facility] 18    3 weeks ago     [employer] in [facility] 18    4 weeks ago     [employer] in [facility] 18    1 month ago     [employer] in [facility] 18    1 month ago     [employer] in [place], [place]    Office Visit          Future Appointments         Provider Department Appt Notes    In 1 week Doris Standard, [address] in [place] 2 visits 10/30 to 12/29  [facility]  [id number]    In 2 months Eleno Chi MD [address], [address], [address] NP [place] EE

## 2023-11-01 PROBLEM — R73.03 PREDIABETES: Chronic | Status: ACTIVE | Noted: 2023-08-04

## 2023-11-01 NOTE — TELEPHONE ENCOUNTER
Log[employer] message sent to pt, await reply. Last ref 10-27-23 for accu check test strips and lancets. Refill passed per [facility] protocol   Requested Prescriptions   Pending Prescriptions Disp Refills    Glucose Blood (ONETOUCH VERIO) In Vitro Strip 100 strip 3     Sig: Check blood sugars one time a day       Diabetic Supplies Protocol Passed - 10/31/2023  8:37 AM        Passed - In person appointment or virtual visit in the past 12 mos or appointment in next 3 mos     Recent Outpatient Visits              [facility] in [facility] 18    3 weeks ago     [address] [place] [address] in [facility] 18    4 weeks ago     [address] [place] [address] in [facility], [facility] 18    1 month ago     [address] [place] [address] in [facility], [facility] 18    1 month ago     [address] [place] [address] in [facility], [address] [place] Visit          Future Appointments         Provider Department Appt Notes    In 1 week [address] in [place] 2 visits 10/30 to   [address]  [id number]    In 2 months Adarsh Brooke MD [address], [address] [facility],[address] Floor, [facility] NP DM EE                        Lancet Devices (ONETOUCH DELICA PLUS LANCING) Does not apply Misc 100 each 3     Si Act daily.  Dispense what is covered by insurance E11.9       Diabetic Supplies Protocol Passed - 10/31/2023  8:37 AM        Passed - In person appointment or virtual visit in the past 12 mos or appointment in next 3 mos     Recent Outpatient Visits              [address]     [address] in [facility], [address] [place] Visit    3 weeks ago     [address] in [facility], [facility] 18    4 weeks ago     [address] [place] [address] Road in [facility], [address] [place] Visit    1 month ago     [address] [place] [address] in [facility]ella Lab, PT    Office Visit    1 month ago     [address] in [place], [place]    Office Visit          Future Appointments         Provider Department Appt Notes    In 1 week Reynold Burns, [address] in [place] 2 visits 10/30 to 12/29  [facility]  [id number]    In 2 months Roman Tinsley MD [address] [facility],[address], [address], [place] NP DM EE

## 2023-11-01 NOTE — TELEPHONE ENCOUNTER
Refill passed per [facility], [facility] protocol. I attached the Problem of Prediabetes to these items. Is that correct? Code: R73.03     Requested Prescriptions   Pending Prescriptions Disp Refills    Glucose Blood (ONETOUCH VERIO) In Vitro Strip 100 strip 3     Sig: Check blood sugars one time a day       Diabetic Supplies Protocol Passed - 10/31/2023 11:12 PM        Passed - In person appointment or virtual visit in the past 12 mos or appointment in next 3 mos     Recent Outpatient Visits              2 days ago     [facility] in [facility], [address]    3 weeks ago     [facility] in [facility], [address]    1 month ago     [facility] in [facility], [address]    1 month ago     [facility] in [facility], [address]    1 month ago     [facility] in [facility], [address] [place] [address] Visit          Future Appointments         Provider Department Appt Notes    In 1 week Valerie Julian [address] in [place] 2 visits 10/30 to   [address]  [id number]    In 2 months Haider Arias MD [address] [facility],[address], [address] [facility],[address], [facility] NP DM EE                        Lancet Devices (ONETOUCH DELICA PLUS LANCING) Does not apply Misc 100 each 3     Si Act daily.  Dispense what is covered by insurance [id number]       Diabetic Supplies Protocol Passed - 10/31/2023 11:12 PM        Passed - In person appointment or virtual visit in the past 12 mos or appointment in next 3 mos     Recent Outpatient Visits              2 days ago     [facility] in [facility], [address]    3 weeks ago     [facility]    1 month ago     [facility]    1 month ago     [facility] in [place] Anson Nolasco, PT    Office Visit    1 month ago     [facility] in [place], [place]    Office Visit          Future Appointments         Provider Department Appt Notes    In 1 week Valerie Mcneill [address] in [place] 2 visits 10/30 to 12/29  [address]  [id number]    In 2 months Valeria Monae MD [address], [address], [facility] NP DM EE                         Future Appointments         Provider Department Appt Notes    In 1 week Valerie Mcneill [address] in [place] 2 visits 10/30 to 12/29  [address]  [id number]    In 2 months Valeria Monae MD [address], [address], [facility] NP DM EE          Recent Outpatient Visits              2 days ago     [facility] in [facility], [address]    3 weeks ago     [facility] in [facility], [address]    1 month ago     [facility]    1 month ago     [facility]    1 month [facility]     [facility] in [place], [place]    Office Visit

## 2023-11-02 ENCOUNTER — APPOINTMENT (OUTPATIENT)
Dept: PHYSICAL THERAPY | Age: 63
End: 2023-11-02
Attending: PHYSICAL MEDICINE & REHABILITATION
Payer: MEDICAID

## 2023-11-02 RX ORDER — BLOOD-GLUCOSE METER
EACH MISCELLANEOUS
Qty: 100 STRIP | Refills: 3 | Status: SHIPPED | OUTPATIENT
Start: 2023-11-02

## 2023-11-02 RX ORDER — LANCETS 33 GAUGE
1 EACH MISCELLANEOUS DAILY
Qty: 100 EACH | Refills: 3 | Status: SHIPPED | OUTPATIENT
Start: 2023-11-02

## 2023-11-08 ENCOUNTER — OFFICE VISIT (OUTPATIENT)
Dept: PHYSICAL THERAPY | Age: 63
End: 2023-11-08
Attending: PHYSICAL MEDICINE & REHABILITATION
Payer: MEDICAID

## 2023-11-08 PROCEDURE — 97110 THERAPEUTIC EXERCISES: CPT

## 2023-11-08 PROCEDURE — 97112 NEUROMUSCULAR REEDUCATION: CPT

## 2023-11-08 NOTE — PROGRESS NOTES
Diagnosis:   Left hip pain M25.552, Primary osteoarthritis of left hip M16.12, Chronic pain of left knee M25.562, Low back pain M54.50      Referring Provider: Ana M Heart  Date of Evaluation:    8/23/23    Precautions:  None Next MD visit:   none scheduled  Date of Surgery: n/a   Insurance Primary/Secondary: BLUE CROSS MEDICAID / N/A     # Auth Visits: 8/9          Progress note:  Subjective: Pt reports that she continues to feel improvement. Pt reports that it's still difficult to get up from the floor, but not at all like before. Pain: <2/10 back       Objective: See table for program.      Assessment:   Overall good improvement in all areas of previous deficit. Pt will continue to improve with continued compliance with HEP. Goals:   (to be met in 12 visits)  Decrease pain to 1/10 at worst to assist with ADLs. - in progress  Pt to perform all lumbar AROM pain free to enable reaching items. - met  Pt to perform all hip AROM pain free to enable negotiating uneven surfaces.-met  Increase hip ABD strength to 5/5 to enable return to prolonged activities. - in progress   Pt to be able to perform 3x10 chair squats with good eccentric control to enable chair transfers.- met  Increase 30 sec sit to stand test to 11 or > to enable squatting.- x 9 reps, in progress  Improve LEFS to 53% or > to enable return to prior functional level. NT  Pt to be independent with [facility] for ongoing management. - met  Pt tolerates floor transfer with min c/o at worst.- met  Pt tolerates sitting 30 minutes and is able to stand up from sitting.- in progress    Plan: Pt is discharged to [facility] at this time. To contact PT with any further questions or concerns.   Date: 10/2/23                TX#: 5/10 Date: 10/9/23  Tx#: 6/10 Date: 10/30/23  Tx#: 7/10 Date: 11/8/23  Tx# 8/9   TE: 30 min  Clams x 2/10 reps  Sidelying hip abd x 2/5 reps L  Shuttle squats 6 bands, B x 2/20  Shuttle squats R/L 4.5 bands 2/15  SLS L, hip abd x 2/10 B  Sidestepping along railing  SL balance 3 cone reach x 5 B  KTC seated 10 sec hold x 5 B  Seated piriformis stretch 10 sec x 5 B. NM: 10 min  Nustep seat 6, lv5, 5 min  5 inch ant step ups x 10 B  5 inch step ups lateral x 10 B        TE: 30 min  Clams x 2/10 reps  Sidelying hip abd x 2/5 reps L  Shuttle squats 6 bands, B x 2/20  Shuttle squats R/L 4.5 bands 2/15  SLS L, hip abd x 2/10 B  Sidestepping along railing  SL balance 3 cone reach x 10 B  KTC seated 10 sec hold x 5 B  Seated piriformis stretch 10 sec x 5 B. NM: 10 min  Nustep seat 6, lv5, 5 min  5 inch ant step ups x 20 B  5 inch step ups lateral x 20 B        TE: 30 min  Clams x 2/10 reps  Sidelying hip abd x 2/6 reps L  Shuttle squats 7 bands, B x 2/10  Shuttle squats R/L 5 bands 2/15  SLS L, hip abd x 10 B  Sidestepping along railing with YTB x 2 laps  SL balance 3 cone reach x 10 B  KTC seated 10 sec hold x 5 B  Seated piriformis stretch 10 sec x 5 B. NM: 10 min  Nustep seat 6, lv5, 5 min  5 inch ant step ups x 20 B  5 inch step ups lateral x 20 B        TE: 30 min  Clams x 15 reps  Sidelying hip abd x 10 reps B  Shuttle squats 8 bands, B x 20  Shuttle squats R/L 6 bands 2/15  SLS L, hip abd x 10 B  Sidestepping along railing with YTB x 3 laps  SL balance 3 cone reach x 10 B  KTC seated 10 sec hold x 5 B  Seated piriformis stretch 10 sec x 5 B. NM: 10 min  Nustep seat 8, lv6, 5 min  5 inch ant step ups x 20 B  5 inch step ups lateral x 20 B                            HEP: Standing hip abd, step ups, side stepping.     Charges: TE2, NM       Total Timed Treatment: 40 min  Total Treatment Time: 40 min

## 2023-11-15 ENCOUNTER — TELEPHONE (OUTPATIENT)
Dept: INTERNAL MEDICINE CLINIC | Facility: CLINIC | Age: 63
End: 2023-11-15

## 2023-11-15 NOTE — TELEPHONE ENCOUNTER
Patients  calling to inform that patient needs the blood glucose monitor for one touch verio test life     Please send to:  [address] [place]

## 2023-11-16 RX ORDER — BLOOD-GLUCOSE METER
1 EACH MISCELLANEOUS DAILY
Qty: 1 KIT | Refills: 0 | OUTPATIENT
Start: 2023-11-16

## 2023-11-16 NOTE — TELEPHONE ENCOUNTER
When /patient calls back --- The OneTouch Verio supplies were already picked up 11/2/23. Please clarify request (see below) from yesterday. RN Newspepper. Patient's date of birth and full name both confirmed. Spoke with Consuelo Thao,   Chart reviewed - Prescriptions sent to pharmacy, to dispense monitor that is Covered by insurance. Asking if there is an issue. Consuelo Thao says that they already picked up the order for OneTouch Verio on 11/2/23 , and all dispensed glucose monitoring supplies were covered by Insurance with $0 co-pay. She's not quite sure what the issue is either. Call Attempt to , on [employer]. No answer.  Left Message to call back

## 2023-11-17 NOTE — TELEPHONE ENCOUNTER
Spoke to patient's spouse Tj Booker (on [employer]), verified Name and . Relayed that blood glucose monitor was sent to the pharmacy today. Spouse verbalized understanding and will follow up with pharmacy. No further questions at this time.

## 2023-12-18 ENCOUNTER — OFFICE VISIT (OUTPATIENT)
Dept: INTERNAL MEDICINE CLINIC | Facility: CLINIC | Age: 63
End: 2023-12-18

## 2023-12-18 ENCOUNTER — LAB ENCOUNTER (OUTPATIENT)
Dept: LAB | Age: 63
End: 2023-12-18
Attending: INTERNAL MEDICINE
Payer: MEDICAID

## 2023-12-18 VITALS
HEART RATE: 62 BPM | WEIGHT: 174.5 LBS | SYSTOLIC BLOOD PRESSURE: 117 MMHG | DIASTOLIC BLOOD PRESSURE: 80 MMHG | RESPIRATION RATE: 18 BRPM | BODY MASS INDEX: 30.92 KG/M2 | HEIGHT: 63 IN

## 2023-12-18 DIAGNOSIS — R73.03 PREDIABETES: Primary | ICD-10-CM

## 2023-12-18 DIAGNOSIS — E01.0 THYROMEGALY: ICD-10-CM

## 2023-12-18 DIAGNOSIS — R73.03 PREDIABETES: ICD-10-CM

## 2023-12-18 DIAGNOSIS — E11.9 DIABETIC EYE EXAM (HCC): ICD-10-CM

## 2023-12-18 DIAGNOSIS — S09.11XA STRAIN OF JAW: ICD-10-CM

## 2023-12-18 DIAGNOSIS — Z01.00 DIABETIC EYE EXAM (HCC): ICD-10-CM

## 2023-12-18 LAB
ALBUMIN SERPL-MCNC: 4.4 G/DL (ref 3.2–4.8)
ALBUMIN/GLOB SERPL: 1.5 {RATIO} (ref 1–2)
ALP LIVER SERPL-CCNC: 70 U/L
ALT SERPL-CCNC: 24 U/L
ANION GAP SERPL CALC-SCNC: 5 MMOL/L (ref 0–18)
AST SERPL-CCNC: 24 U/L (ref ?–34)
BASOPHILS # BLD AUTO: 0.03 X10(3) UL (ref 0–0.2)
BASOPHILS NFR BLD AUTO: 0.5 %
BILIRUB SERPL-MCNC: 0.4 MG/DL (ref 0.2–1.1)
BUN BLD-MCNC: 14 MG/DL (ref 9–23)
BUN/CREAT SERPL: 19.7 (ref 10–20)
CALCIUM BLD-MCNC: 9.7 MG/DL (ref 8.7–10.4)
CHLORIDE SERPL-SCNC: 105 MMOL/L (ref 98–112)
CHOLEST SERPL-MCNC: 174 MG/DL (ref ?–200)
CO2 SERPL-SCNC: 28 MMOL/L (ref 21–32)
CREAT BLD-MCNC: 0.71 MG/DL
DEPRECATED RDW RBC AUTO: 42.6 FL (ref 35.1–46.3)
EGFRCR SERPLBLD CKD-EPI 2021: 97 ML/MIN/1.73M2 (ref 60–?)
EOSINOPHIL # BLD AUTO: 0.13 X10(3) UL (ref 0–0.7)
EOSINOPHIL NFR BLD AUTO: 2.4 %
ERYTHROCYTE [DISTWIDTH] IN BLOOD BY AUTOMATED COUNT: 13.2 % (ref 11–15)
EST. AVERAGE GLUCOSE BLD GHB EST-MCNC: 140 MG/DL (ref 68–126)
FASTING PATIENT LIPID ANSWER: YES
FASTING STATUS PATIENT QL REPORTED: YES
GLOBULIN PLAS-MCNC: 2.9 G/DL (ref 2.8–4.4)
GLUCOSE BLD-MCNC: 119 MG/DL (ref 70–99)
HBA1C MFR BLD: 6.5 % (ref ?–5.7)
HCT VFR BLD AUTO: 40 %
HDLC SERPL-MCNC: 46 MG/DL (ref 40–59)
HGB BLD-MCNC: 12.8 G/DL
IMM GRANULOCYTES # BLD AUTO: 0.01 X10(3) UL (ref 0–1)
IMM GRANULOCYTES NFR BLD: 0.2 %
LDLC SERPL CALC-MCNC: 114 MG/DL (ref ?–100)
LYMPHOCYTES # BLD AUTO: 2.03 X10(3) UL (ref 1–4)
LYMPHOCYTES NFR BLD AUTO: 37.2 %
MCH RBC QN AUTO: 28.1 PG (ref 26–34)
MCHC RBC AUTO-ENTMCNC: 32 G/DL (ref 31–37)
MCV RBC AUTO: 87.7 FL
MONOCYTES # BLD AUTO: 0.4 X10(3) UL (ref 0.1–1)
MONOCYTES NFR BLD AUTO: 7.3 %
NEUTROPHILS # BLD AUTO: 2.86 X10 (3) UL (ref 1.5–7.7)
NEUTROPHILS # BLD AUTO: 2.86 X10(3) UL (ref 1.5–7.7)
NEUTROPHILS NFR BLD AUTO: 52.4 %
NONHDLC SERPL-MCNC: 128 MG/DL (ref ?–130)
OSMOLALITY SERPL CALC.SUM OF ELEC: 288 MOSM/KG (ref 275–295)
PLATELET # BLD AUTO: 284 10(3)UL (ref 150–450)
POTASSIUM SERPL-SCNC: 4.3 MMOL/L (ref 3.5–5.1)
PROT SERPL-MCNC: 7.3 G/DL (ref 5.7–8.2)
RBC # BLD AUTO: 4.56 X10(6)UL
SODIUM SERPL-SCNC: 138 MMOL/L (ref 136–145)
TRIGL SERPL-MCNC: 75 MG/DL (ref 30–149)
VLDLC SERPL CALC-MCNC: 13 MG/DL (ref 0–30)
WBC # BLD AUTO: 5.5 X10(3) UL (ref 4–11)

## 2023-12-18 PROCEDURE — 80053 COMPREHEN METABOLIC PANEL: CPT

## 2023-12-18 PROCEDURE — 85025 COMPLETE CBC W/AUTO DIFF WBC: CPT

## 2023-12-18 PROCEDURE — 36415 COLL VENOUS BLD VENIPUNCTURE: CPT

## 2023-12-18 PROCEDURE — 80061 LIPID PANEL: CPT

## 2023-12-18 PROCEDURE — 83036 HEMOGLOBIN GLYCOSYLATED A1C: CPT

## 2023-12-18 RX ORDER — CYCLOBENZAPRINE HCL 5 MG
TABLET ORAL
Qty: 15 TABLET | Refills: 0 | Status: SHIPPED | OUTPATIENT
Start: 2023-12-18

## 2023-12-18 RX ORDER — AMOXICILLIN 875 MG/1
875 TABLET, COATED ORAL 2 TIMES DAILY
Qty: 14 TABLET | Refills: 0 | Status: SHIPPED | OUTPATIENT
Start: 2023-12-18

## 2024-01-24 ENCOUNTER — OFFICE VISIT (OUTPATIENT)
Dept: OPHTHALMOLOGY | Facility: CLINIC | Age: 64
End: 2024-01-24

## 2024-01-24 DIAGNOSIS — H43.392 VITREOUS FLOATERS OF LEFT EYE: ICD-10-CM

## 2024-01-24 DIAGNOSIS — R73.03 PREDIABETES: Primary | ICD-10-CM

## 2024-01-24 DIAGNOSIS — H25.13 AGE-RELATED NUCLEAR CATARACT OF BOTH EYES: ICD-10-CM

## 2024-01-24 PROCEDURE — 92015 DETERMINE REFRACTIVE STATE: CPT | Performed by: OPHTHALMOLOGY

## 2024-01-24 PROCEDURE — 3072F LOW RISK FOR RETINOPATHY: CPT | Performed by: OPHTHALMOLOGY

## 2024-01-24 PROCEDURE — 92004 COMPRE OPH EXAM NEW PT 1/>: CPT | Performed by: OPHTHALMOLOGY

## 2024-01-24 NOTE — PATIENT INSTRUCTIONS
Prediabetes  Diet controlled diabetes: no background of retinopathy, no signs of neovascularization noted.  Discussed ocular and systemic benefits of blood sugar control.  Diagnosis and treatment discussed in detail with patient.    Will see patient in 2 years for a diabetic exam    Age-related nuclear cataract of both eyes  Discussed early cataracts with patient.  No treatment recommended at this time.     New glasses Rx given today for distance only glasses, update as needed    Vitreous floaters of left eye   There is no evidence of retinal pathology.  All signs and symptoms of retinal detachment/tears explained in detail.    Patient instructed to call the office if they experience increase in floaters, increase in flashes of light, loss of vision or curtain or veil effect.

## 2024-01-24 NOTE — ASSESSMENT & PLAN NOTE
Diet controlled diabetes: no background of retinopathy, no signs of neovascularization noted.  Discussed ocular and systemic benefits of blood sugar control.  Diagnosis and treatment discussed in detail with patient.    Will see patient in 2 years for a diabetic exam

## 2024-01-24 NOTE — PROGRESS NOTES
Fiordaliza Leblanc is a 63 year old female.    HPI:     HPI    Patient is here for a diabetic exam. She states vision is stable and has no complaints. She would like an updated rx today.    Pt has been a pre diabetic for 2 years       Pt's diabetes is currently controlled by diet  Pt rarely checks BS   Pt's last blood sugar was  119 on 23  Last HA1C was 6.5 on 23  Endocrinologist: none     Referred by Dr. Thompson  Last edited by Rehana Cummins OT on 2024  3:24 PM.        Patient History:  Past Medical History:   Diagnosis Date    Erosive gastritis 3/7/2019    History of pregnancy ,     per NG    Internal hemorrhoids without complication 8/10/2018       Surgical History: Fiordaliza Leblanc has a past surgical history that includes  () (per NG);  () (per NG); hysterectomy () (per NG); and colonoscopy (N/A, 8/10/2018) (Procedure: COLONOSCOPY;  Surgeon: Lorri De León MD;  Location: [facility] ENDOSCOPY).    Family History   Problem Relation Age of Onset    Blood Disorder Father         DVT; per NG    Diabetes Sister     Diabetes Brother     Glaucoma Neg     Macular degeneration Neg        Social History:   Social History     Socioeconomic History    Marital status:    Tobacco Use    Smoking status: Never    Smokeless tobacco: Never    Tobacco comments:     per NG   Vaping Use    Vaping Use: Never used   Substance and Sexual Activity    Alcohol use: No     Comment: per NG    Drug use: No    Sexual activity: Not Currently   Other Topics Concern    Caffeine Concern Yes     Comment: Coffee, 1 cup; per NG       Medications:  Current Outpatient Medications   Medication Sig Dispense Refill    amoxicillin 875 MG Oral Tab Take 1 tablet (875 mg total) by mouth 2 (two) times daily. 14 tablet 0    cyclobenzaprine 5 MG Oral Tab Take 1 tab po at bedtime 15 tablet 0    Blood Glucose Monitoring Suppl Does not apply Kit Check blood sugar once daily. 1 kit 0    Glucose Blood  (ONETOUCH VERIO) In Vitro Strip Check blood sugars one time a day 100 strip 3    Lancet Devices (ONETOUCH DELICA PLUS LANCING) Does not apply Misc 1 Lancet by Finger stick route daily. 100 each 3    Glucose Blood (ACCU-CHEK GUIDE) In Vitro Strip 1 each by In Vitro route daily. 100 strip 3    Accu-Chek FastClix Lancets Does not apply Misc Use a directed once daily 100 each 3    EMGALITY 120 MG/ML Subcutaneous Solution Auto-injector 1 each daily.         Allergies:  No Known Allergies    ROS:       PHYSICAL EXAM:     Base Eye Exam       Visual Acuity (Snellen - Linear)         Right Left    Dist sc 20/25 20/25 -3    Near sc 20/20 20/25              Tonometry (Icare, 3:40 PM)         Right Left    Pressure 14 13              Pupils         Pupils    Right PERRL    Left PERRL              Visual Fields         Left Right     Full Full              Extraocular Movement         Right Left     Full, Ortho Full, Ortho              Neuro/Psych       Oriented x3: Yes              Dilation       Both eyes: 1.0% Mydriacyl and 2.5% Volodymyr Synephrine @ 3:41 PM              Dilation #2       Both eyes: 1.0% Mydriacyl and 2.5% Volodymyr Synephrine @ 3:42 PM                  Slit Lamp and Fundus Exam       Slit Lamp Exam         Right Left    Lids/Lashes Dermatochalasis, Meibomian gland dysfunction Dermatochalasis, Meibomian gland dysfunction    Conjunctiva/Sclera Normal Nasal pinguecula    Cornea Clear Clear    Anterior Chamber Deep and quiet Deep and quiet    Iris Normal Normal    Lens 1+ Nuclear sclerosis, 1+ anterior and posterior cortical cataract nasally 1+ Nuclear sclerosis, trace anterior cortical cataract nasally    Vitreous Clear Vitreous floaters              Fundus Exam         Right Left    Disc Good rim, Temporal crescent Good rim, Temporal crescent    C/D Ratio 0.25 0.3    Macula Normal-no BDR Normal-no BDR    Vessels Normal Normal    Periphery Normal Normal                  Refraction       Wearing Rx    Patient wears glasses  for driving but forgot to bring them in.             Manifest Refraction (Auto)         Sphere Cylinder Axis Dist VA    Right -0.75 +0.75 140     Left -1.00 +0.50 120               Manifest Refraction #2         Sphere Cylinder [place] Dist VA    Right -1.00 +0.75 140 20/20    Left -1.00 +0.75 120 20/20-              Final Rx         Sphere Cylinder [place] Dist VA    Right -1.00 +0.75 140 20/20    Left -1.00 +0.75 120 20/20-      Type: Distance only                     ASSESSMENT/PLAN:     Diagnoses and Plan:     Prediabetes  Diet controlled diabetes: no background of retinopathy, no signs of neovascularization noted.  Discussed ocular and systemic benefits of blood sugar control.  Diagnosis and treatment discussed in detail with patient.    Will see patient in 2 years for a diabetic exam    Age-related nuclear cataract of both eyes  Discussed early cataracts with patient.  No treatment recommended at this time.     New glasses Rx given today for distance only glasses, update as needed    Vitreous floaters of left eye   There is no evidence of retinal pathology.  All signs and symptoms of retinal detachment/tears explained in detail.    Patient instructed to call the office if they experience increase in floaters, increase in flashes of light, loss of vision or curtain or veil effect.       No orders of the defined types were placed in this encounter.      Meds This Visit:  Requested Prescriptions      No prescriptions requested or ordered in this encounter        Follow up instructions:  Return in about 1 year (around 1/24/2025) for Diabetic eye exam.    1/24/2024  Scribed by: Ignacio Allen MD

## 2024-01-24 NOTE — ASSESSMENT & PLAN NOTE
Discussed early cataracts with patient.  No treatment recommended at this time.     New glasses Rx given today for distance only glasses, update as needed

## 2024-03-26 ENCOUNTER — APPOINTMENT (OUTPATIENT)
Dept: GENERAL RADIOLOGY | Facility: HOSPITAL | Age: 64
End: 2024-03-26
Attending: EMERGENCY MEDICINE
Payer: MEDICAID

## 2024-03-26 ENCOUNTER — HOSPITAL ENCOUNTER (EMERGENCY)
Facility: HOSPITAL | Age: 64
Discharge: HOME | End: 2024-03-26
Attending: EMERGENCY MEDICINE
Payer: MEDICAID

## 2024-03-26 VITALS
TEMPERATURE: 97 F | BODY MASS INDEX: 30.56 KG/M2 | HEART RATE: 60 BPM | DIASTOLIC BLOOD PRESSURE: 78 MMHG | WEIGHT: 179 LBS | HEIGHT: 64 IN | RESPIRATION RATE: 23 BRPM | OXYGEN SATURATION: 96 % | SYSTOLIC BLOOD PRESSURE: 110 MMHG

## 2024-03-26 DIAGNOSIS — R30.0 DYSURIA: ICD-10-CM

## 2024-03-26 DIAGNOSIS — R07.9 CHEST PAIN OF UNCERTAIN ETIOLOGY: Primary | ICD-10-CM

## 2024-03-26 LAB
ALBUMIN SERPL-MCNC: 4.6 G/DL (ref 3.2–4.8)
ALBUMIN/GLOB SERPL: 1.6 {RATIO} (ref 1–2)
ALP LIVER SERPL-CCNC: 81 U/L
ALT SERPL-CCNC: 23 U/L
ANION GAP SERPL CALC-SCNC: 6 MMOL/L (ref 0–18)
AST SERPL-CCNC: 14 U/L (ref ?–34)
BASOPHILS # BLD AUTO: 0.03 X10(3) UL (ref 0–0.2)
BASOPHILS NFR BLD AUTO: 0.5 %
BILIRUB SERPL-MCNC: 0.4 MG/DL (ref 0.2–1.1)
BILIRUB UR QL: NEGATIVE
BUN BLD-MCNC: 14 MG/DL (ref 9–23)
BUN/CREAT SERPL: 20.6 (ref 10–20)
CALCIUM BLD-MCNC: 9.6 MG/DL (ref 8.7–10.4)
CHLORIDE SERPL-SCNC: 110 MMOL/L (ref 98–112)
CLARITY UR: CLEAR
CO2 SERPL-SCNC: 25 MMOL/L (ref 21–32)
CREAT BLD-MCNC: 0.68 MG/DL
DEPRECATED RDW RBC AUTO: 42.5 FL (ref 35.1–46.3)
EGFRCR SERPLBLD CKD-EPI 2021: 98 ML/MIN/1.73M2 (ref 60–?)
EOSINOPHIL # BLD AUTO: 0.15 X10(3) UL (ref 0–0.7)
EOSINOPHIL NFR BLD AUTO: 2.5 %
ERYTHROCYTE [DISTWIDTH] IN BLOOD BY AUTOMATED COUNT: 13.6 % (ref 11–15)
GLOBULIN PLAS-MCNC: 2.9 G/DL (ref 2.8–4.4)
GLUCOSE BLD-MCNC: 106 MG/DL (ref 70–99)
GLUCOSE BLDC GLUCOMTR-MCNC: 114 MG/DL (ref 70–99)
GLUCOSE UR-MCNC: NORMAL MG/DL
HCT VFR BLD AUTO: 41.2 %
HGB BLD-MCNC: 13.9 G/DL
HGB UR QL STRIP.AUTO: NEGATIVE
IMM GRANULOCYTES # BLD AUTO: 0.01 X10(3) UL (ref 0–1)
IMM GRANULOCYTES NFR BLD: 0.2 %
KETONES UR-MCNC: NEGATIVE MG/DL
LEUKOCYTE ESTERASE UR QL STRIP.AUTO: NEGATIVE
LYMPHOCYTES # BLD AUTO: 2.32 X10(3) UL (ref 1–4)
LYMPHOCYTES NFR BLD AUTO: 38.4 %
MCH RBC QN AUTO: 28.8 PG (ref 26–34)
MCHC RBC AUTO-ENTMCNC: 33.7 G/DL (ref 31–37)
MCV RBC AUTO: 85.3 FL
MONOCYTES # BLD AUTO: 0.47 X10(3) UL (ref 0.1–1)
MONOCYTES NFR BLD AUTO: 7.8 %
NEUTROPHILS # BLD AUTO: 3.06 X10 (3) UL (ref 1.5–7.7)
NEUTROPHILS # BLD AUTO: 3.06 X10(3) UL (ref 1.5–7.7)
NEUTROPHILS NFR BLD AUTO: 50.6 %
NITRITE UR QL STRIP.AUTO: NEGATIVE
OSMOLALITY SERPL CALC.SUM OF ELEC: 293 MOSM/KG (ref 275–295)
PH UR: 5 [PH] (ref 5–8)
PLATELET # BLD AUTO: 270 10(3)UL (ref 150–450)
POTASSIUM SERPL-SCNC: 4.1 MMOL/L (ref 3.5–5.1)
PROT SERPL-MCNC: 7.5 G/DL (ref 5.7–8.2)
PROT UR-MCNC: NEGATIVE MG/DL
RBC # BLD AUTO: 4.83 X10(6)UL
SODIUM SERPL-SCNC: 141 MMOL/L (ref 136–145)
SP GR UR STRIP: 1.02 (ref 1–1.03)
TROPONIN I SERPL HS-MCNC: <3 NG/L
UROBILINOGEN UR STRIP-ACNC: NORMAL
WBC # BLD AUTO: 6 X10(3) UL (ref 4–11)

## 2024-03-26 PROCEDURE — 82962 GLUCOSE BLOOD TEST: CPT

## 2024-03-26 PROCEDURE — 85025 COMPLETE CBC W/AUTO DIFF WBC: CPT | Performed by: EMERGENCY MEDICINE

## 2024-03-26 PROCEDURE — 71045 X-RAY EXAM CHEST 1 VIEW: CPT | Performed by: EMERGENCY MEDICINE

## 2024-03-26 PROCEDURE — 81003 URINALYSIS AUTO W/O SCOPE: CPT | Performed by: EMERGENCY MEDICINE

## 2024-03-26 PROCEDURE — 36415 COLL VENOUS BLD VENIPUNCTURE: CPT

## 2024-03-26 PROCEDURE — 93010 ELECTROCARDIOGRAM REPORT: CPT

## 2024-03-26 PROCEDURE — 80053 COMPREHEN METABOLIC PANEL: CPT | Performed by: EMERGENCY MEDICINE

## 2024-03-26 PROCEDURE — 93005 ELECTROCARDIOGRAM TRACING: CPT

## 2024-03-26 PROCEDURE — 99285 EMERGENCY DEPT VISIT HI MDM: CPT

## 2024-03-26 PROCEDURE — 99284 EMERGENCY DEPT VISIT MOD MDM: CPT

## 2024-03-26 PROCEDURE — 84484 ASSAY OF TROPONIN QUANT: CPT | Performed by: EMERGENCY MEDICINE

## 2024-03-26 NOTE — ED PROVIDER NOTES
Patient Seen in: [facility] Emergency Department    History     Chief Complaint   Patient presents with    Chest Pain Angina    Urinary Symptoms       HPI    History is provided by patient/independent historian: Patient, Patient's   64 year old female with history of erosive gastritis, here with complaints of chest pain, dizziness and nausea for the past day.  Patient woke up feeling hot.  Symptoms started greater than 6 hours prior to arrival.  She is not under more stress than usual.  She has some slight shortness of breath.   also reports concern for UTI as she is complaining of dysuria.    History reviewed.   Past Medical History:   Diagnosis Date    Erosive gastritis 3/7/2019    History of pregnancy ,     per NG    Internal hemorrhoids without complication 8/10/2018         History reviewed.   Past Surgical History:   Procedure Laterality Date          per NG          per NG    COLONOSCOPY N/A 8/10/2018    Procedure: COLONOSCOPY;  Surgeon: Lorri De León MD;  [facility] ENDOSCOPY    HYSTERECTOMY      per NG         Home Medications reviewed :  (Not in a hospital admission)        History reviewed.   Social History     Socioeconomic History    Marital status:    Tobacco Use    Smoking status: Never    Smokeless tobacco: Never    Tobacco comments:     per NG   Vaping Use    Vaping Use: Never used   Substance and Sexual Activity    Alcohol use: No     Comment: per NG    Drug use: No    Sexual activity: Not Currently   Other Topics Concern    Caffeine Concern Yes     Comment: Coffee, 1 cup; per NG         ROS  Review of Systems   Respiratory:  Positive for shortness of breath.    Cardiovascular:  Positive for chest pain.   Gastrointestinal:  Positive for nausea.   Genitourinary:  Positive for dysuria.   Neurological:  Positive for dizziness.   All other systems reviewed and are negative.     All other pertinent organ systems are  reviewed and are negative.      Physical Exam     ED Triage Vitals [03/26/24 1531]   /82   Pulse 69   Resp 20   Temp 97.3 °F (36.3 °C)   Temp src Temporal   SpO2 97 %   O2 Device None (Room air)     Vital signs reviewed.      Physical Exam  Vitals and nursing note reviewed.   Cardiovascular:      Pulses: Normal pulses.   Pulmonary:      Effort: No respiratory distress.   Chest:      Chest wall: No tenderness.   Abdominal:      General: There is no distension.   Neurological:      Mental Status: She is alert.         ED Course       Labs:     Labs Reviewed   COMP METABOLIC PANEL (14) - Abnormal; Notable for the following components:       Result Value    Glucose 106 (*)     BUN/CREA Ratio 20.6 (*)     All other components within normal limits   POCT GLUCOSE - Abnormal; Notable for the following components:    POC Glucose  114 (*)     All other components within normal limits   TROPONIN I HIGH SENSITIVITY - Normal   URINALYSIS WITH CULTURE REFLEX   CBC WITH DIFFERENTIAL WITH PLATELET    Narrative:     The following orders were created for panel order CBC With Differential With Platelet.                  Procedure                               Abnormality         Status                                     ---------                               -----------         ------                                     CBC W/ DIFFERENTIAL[[id number]]                              Final result                                                 Please view results for these tests on the individual orders.   RAINBOW DRAW BLUE   CBC W/ DIFFERENTIAL         My EKG Interpretation: EKG    Rate, intervals and axes as noted on EKG Report.  Rate: 61  Rhythm: Sinus Rhythm  Reading: normal for rate, normal for rhythm, no acute ST changes           As reviewed and Interpreted by me      Imaging Results Available and Reviewed while in ED:   XR CHEST AP PORTABLE  (CPT=71045)    Result Date: 3/26/2024  CONCLUSION: No acute cardiopulmonary  disease.    Dictated by (CST): Marcio Soto MD on 3/26/2024 at 4:37 PM     Finalized by (CST): Marcio Soto MD on 3/26/2024 at 4:38 PM         My review and independent interpretation of XR images: no infiltrate. Radiology report corroborates this in addition to other details as reported by them.      Decision rules/scores evaluated:   HEART Score for cardiac disease  H.E.A.R.T Score  ===================================  History:  + 2 Highly Suspicious  +1 Moderately Suspicious  +0 Slightly Suspicious      EKG:   +2 ST depression  +1 Non-specific   +0 Normal    64 year old  + 2   65 and over  +1  45-64  +0  44 and under    Risk factors     +2 for 3 or more  +1 for 1-2  +0 for 0  Hypercholesterolemia  Hypertension  Diabetes Mellitus  Cigarette smoking  Positive family history  Obesity    Troponin  +2 3x upper limit or more  +1 1-3x upper limit  +0  Less than or equal to upper limit  =====================================    0-3: 2.5% risk of adverse cardiac event.  4-6: 20.3% risk of adverse cardiac event, suggesting admission to the hospital  ?7: 72.7% risk of adverse cardiac event, suggesting early invasive measures        Diagnostic labs/tests considered but not ordered: ddimer    ED Medications Administered: Medications - No data to display             MDM       Medical Decision Making      Differential Diagnosis: After obtaining the patient's history, performing the physical exam and reviewing the diagnostics, multiple initial diagnoses were considered based on the presenting problem including ACS, anxiety, GERD, chest wall pain, pneumonia, viral syndrome    External document review: I personally reviewed available external medical records for any recent pertinent discharge summaries, testing, and procedures - the findings are as follows: 1/24/24 visit with Dr. Allen for  diabetic exam    Complicating Factors: The patient already  has a past medical history of Erosive gastritis (3/7/2019), History of  pregnancy (1998, 1990), and Internal hemorrhoids without complication (8/10/2018). to contribute to the complexity of this ED evaluation.    Procedures performed: none    Discussed management with physician/appropriate source: none    Considered admission/deescalation of care for: chest pain    Social determinants of health affecting patient care: none    Prescription medications considered: discussed continuing current medication regimen    The patient requires continuous monitoring for: chest pain, dysuria    Shared decision making: discussed possible admission        Disposition and Plan     Clinical Impression:  1. Chest pain of uncertain etiology    2. Dysuria        Disposition:  Discharge    Follow-up:  Carmina Thompson MD  [address] [place] [id number] [phone]    Follow up        Medications Prescribed:  Current Discharge Medication List

## 2024-03-26 NOTE — ED INITIAL ASSESSMENT (HPI)
PT to ED reporting CP,  dizziness & nausea x 1 day and dysuria x 2 weeks. Denies CP in triage.     Speaks Indonesian,  provided interpretation, denies .

## 2024-03-27 LAB
ATRIAL RATE: 61 BPM
P AXIS: 70 DEGREES
P-R INTERVAL: 146 MS
Q-T INTERVAL: 402 MS
QRS DURATION: 72 MS
QTC CALCULATION (BEZET): 404 MS
R AXIS: 17 DEGREES
T AXIS: 2 DEGREES
VENTRICULAR RATE: 61 BPM

## 2024-04-09 ENCOUNTER — OFFICE VISIT (OUTPATIENT)
Dept: INTERNAL MEDICINE CLINIC | Facility: CLINIC | Age: 64
End: 2024-04-09

## 2024-04-09 ENCOUNTER — LAB ENCOUNTER (OUTPATIENT)
Dept: LAB | Age: 64
End: 2024-04-09
Attending: INTERNAL MEDICINE
Payer: MEDICAID

## 2024-04-09 VITALS
RESPIRATION RATE: 16 BRPM | DIASTOLIC BLOOD PRESSURE: 77 MMHG | HEIGHT: 64 IN | BODY MASS INDEX: 29.88 KG/M2 | HEART RATE: 67 BPM | WEIGHT: 175 LBS | SYSTOLIC BLOOD PRESSURE: 112 MMHG

## 2024-04-09 DIAGNOSIS — E11.9 TYPE 2 DIABETES MELLITUS WITHOUT COMPLICATION, WITHOUT LONG-TERM CURRENT USE OF INSULIN (HCC): ICD-10-CM

## 2024-04-09 DIAGNOSIS — R42 DIZZINESS: ICD-10-CM

## 2024-04-09 DIAGNOSIS — R07.89 OTHER CHEST PAIN: ICD-10-CM

## 2024-04-09 DIAGNOSIS — R94.31 ABNORMAL EKG: ICD-10-CM

## 2024-04-09 DIAGNOSIS — E11.9 TYPE 2 DIABETES MELLITUS WITHOUT COMPLICATION, WITHOUT LONG-TERM CURRENT USE OF INSULIN (HCC): Primary | ICD-10-CM

## 2024-04-09 LAB
EST. AVERAGE GLUCOSE BLD GHB EST-MCNC: 143 MG/DL (ref 68–126)
HBA1C MFR BLD: 6.6 % (ref ?–5.7)

## 2024-04-09 PROCEDURE — 36415 COLL VENOUS BLD VENIPUNCTURE: CPT

## 2024-04-09 PROCEDURE — 83036 HEMOGLOBIN GLYCOSYLATED A1C: CPT

## 2024-04-09 RX ORDER — LEVOCETIRIZINE DIHYDROCHLORIDE 5 MG/1
5 TABLET, FILM COATED ORAL EVERY EVENING
Qty: 90 TABLET | Refills: 0 | Status: SHIPPED | OUTPATIENT
Start: 2024-04-09

## 2024-04-09 NOTE — PROGRESS NOTES
Subjective:     Patient ID: Fiordaliza Leblanc is a 64 year old female.  Presents for follow-up  HPI  Patient is here accompanied by her , recently went to emergency room because of the chest pains workup was negative, chest pains and squeezing in nature they come and go on exertion may happen at rest as well.  Also bothered by feeling of lightheadedness on and off, worse first thing in the morning.  She states that she consumes adequate amount of fluids .  She denies headaches, no tinnitus or hearing loss.  Denies vertigo or visual disturbance    Current Outpatient Medications   Medication Sig Dispense Refill    levocetirizine 5 MG Oral Tab Take 1 tablet (5 mg total) by mouth every evening. 90 tablet 0    Blood Glucose Monitoring Suppl Does not apply Kit Check blood sugar once daily. 1 kit 0    Glucose Blood (ONETOUCH VERIO) In Vitro Strip Check blood sugars one time a day 100 strip 3    Lancet Devices (GamePressUCH DELICA PLUS LANCING) Does not apply Misc 1 Lancet by Finger stick route daily. 100 each 3    Glucose Blood (ACCU-CHEK GUIDE) In Vitro Strip 1 each by In Vitro route daily. 100 strip 3    Accu-Chek FastClix Lancets Does not apply Misc Use a directed once daily 100 each 3    amoxicillin 875 MG Oral Tab Take 1 tablet (875 mg total) by mouth 2 (two) times daily. (Patient not taking: Reported on 2024) 14 tablet 0    cyclobenzaprine 5 MG Oral Tab Take 1 tab po at bedtime (Patient not taking: Reported on 2024) 15 tablet 0    EMGALITY 120 MG/ML Subcutaneous Solution Auto-injector 1 each daily. (Patient not taking: Reported on 2024)       Allergies:No Known Allergies    Past Medical History:   Diagnosis Date    Erosive gastritis 3/7/2019    History of pregnancy ,     per NG    Internal hemorrhoids without complication 8/10/2018      Past Surgical History:   Procedure Laterality Date          per NG          per NG    COLONOSCOPY N/A 8/10/2018    Procedure: COLONOSCOPY;   Surgeon: Lorri De León MD;  Location: [facility] ENDOSCOPY    HYSTERECTOMY  2011    per NG      Family History   Problem Relation Age of Onset    Blood Disorder Father         DVT; per NG    Diabetes Sister     Diabetes Brother     Glaucoma Neg     Macular degeneration Neg       Social History:   Social History     Socioeconomic History    Marital status:    Tobacco Use    Smoking status: Never    Smokeless tobacco: Never    Tobacco comments:     per NG   Vaping Use    Vaping Use: Never used   Substance and Sexual Activity    Alcohol use: No     Comment: per NG    Drug use: No    Sexual activity: Not Currently   Other Topics Concern    Caffeine Concern Yes     Comment: Coffee, 1 cup; per NG        /77 (BP Location: Left arm, Patient Position: Sitting, Cuff Size: large)   Pulse 67   Resp 16   Ht 5' 4\" (1.626 m)   Wt 175 lb (79.4 kg)   BMI 30.04 kg/m²    Physical Exam  Constitutional:       Appearance: Normal appearance.   HENT:      Head: Normocephalic and atraumatic.      Right Ear: There is no impacted cerumen.      Left Ear: There is no impacted cerumen.   Eyes:      General: No scleral icterus.     Extraocular Movements: Extraocular movements intact.      Conjunctiva/sclera: Conjunctivae normal.      Pupils: Pupils are equal, round, and reactive to light.   Neck:      Vascular: No carotid bruit.   Cardiovascular:      Rate and Rhythm: Normal rate and regular rhythm.   Pulmonary:      Effort: Pulmonary effort is normal.      Breath sounds: No wheezing or rhonchi.   Abdominal:      General: Bowel sounds are normal.      Palpations: Abdomen is soft. There is no mass.      Tenderness: There is no abdominal tenderness. There is no guarding or rebound.   Musculoskeletal:         General: Normal range of motion.      Cervical back: Normal range of motion and neck supple.      Right lower leg: No edema.      Left lower leg: No edema.   Lymphadenopathy:      Cervical: No cervical adenopathy.    Skin:     General: Skin is warm.      Coloration: Skin is not jaundiced.      Findings: No bruising.   Neurological:      General: No focal deficit present.      Mental Status: She is alert and oriented to person, place, and time. Mental status is at baseline.   Psychiatric:         Mood and Affect: Mood normal.         Behavior: Behavior normal.         Thought Content: Thought content normal.         Assessment & Plan:   1. Type 2 diabetes mellitus without complication, without long-term current use of insulin (HCC) controlled continue diet check labs including hemoglobin A1c   2. Other chest pain stress test ordered advised patient to schedule   3. Abnormal EKG    4. Dizziness etiology not clear, will order 2D Doppler echocardiogram in the setting of the chest pain rule out thoracic valvular heart disease vs cardiomyopathy       Orders Placed This Encounter   Procedures    Hemoglobin A1C       Meds This Visit:  Requested Prescriptions     Signed Prescriptions Disp Refills    levocetirizine 5 MG Oral Tab 90 tablet 0     Sig: Take 1 tablet (5 mg total) by mouth every evening.       Imaging & Referrals:  CARD ECHO 2D DOPPLER (CPT=93306)  CARD NUC EXERCISE STRESS (REST/EXER) (CPT=78452)

## 2024-07-02 NOTE — PROGRESS NOTES
DIOGENESM, and sent My Chart - Pt to call back and schedule Neuropsychology Eval with  for 7/15 at 8 am slot is on hold for patient.    If [facility] not able to schedule please confirm with pt appointment and/or send a TE.    Phone number to the clinic scheduling and  coordinator was provider to pt via .    Raegan Cannon on 7/2/2024 at 1:29 PM     HPI:    Patient ID: Amina Quezada is a 64year old female. Gastroenterology follow-up visit:    History of present illness: This is a 61-year-old female sent of Dr. Nikita Ma who I saw last August for screening colonoscopy.   Summary of that procedu Trace sludge within the dependent gallbladder. No gallbladder wall thickening, luminal dilation, or pericholecystic fluid. Sonographic Genaro Ivory sign is negative. BILE DUCTS:    Normal.  Common bile duct measures 4 mm.     OTHER:             Negative clearance: The patient is ASA class II and a good candidate for elective outpatient procedure with monitored anesthesia care or IV sedation. Instructions given to patient:    1. Avoid all NSAIDs! !! this includes Excedrin, aspirin, Advil, Aleve, ibuprof for headaches. Negative for dizziness, tremors, seizures, syncope, weakness and light-headedness. Hematological: Negative for adenopathy. Does not bruise/bleed easily. Psychiatric/Behavioral: Negative for agitation and behavioral problems.    All other tenderness. There is no CVA tenderness. No hernia. Musculoskeletal: Normal range of motion. She exhibits no edema or tenderness. Lymphadenopathy:     She has no cervical adenopathy. Neurological: She is alert and oriented to person, place, and time.

## 2024-08-19 ENCOUNTER — OFFICE VISIT (OUTPATIENT)
Dept: INTERNAL MEDICINE CLINIC | Facility: CLINIC | Age: 64
End: 2024-08-19

## 2024-08-19 VITALS
HEART RATE: 72 BPM | SYSTOLIC BLOOD PRESSURE: 116 MMHG | WEIGHT: 177 LBS | BODY MASS INDEX: 30.22 KG/M2 | DIASTOLIC BLOOD PRESSURE: 78 MMHG | HEIGHT: 64 IN

## 2024-08-19 DIAGNOSIS — R53.83 OTHER FATIGUE: ICD-10-CM

## 2024-08-19 DIAGNOSIS — E11.9 TYPE 2 DIABETES MELLITUS WITHOUT COMPLICATION, WITHOUT LONG-TERM CURRENT USE OF INSULIN (HCC): Primary | ICD-10-CM

## 2024-08-19 DIAGNOSIS — R21 RASH AND NONSPECIFIC SKIN ERUPTION: ICD-10-CM

## 2024-08-19 DIAGNOSIS — Z12.31 BREAST CANCER SCREENING BY MAMMOGRAM: ICD-10-CM

## 2024-08-19 DIAGNOSIS — Z13.21 ENCOUNTER FOR VITAMIN DEFICIENCY SCREENING: ICD-10-CM

## 2024-08-19 PROCEDURE — 99213 OFFICE O/P EST LOW 20 MIN: CPT | Performed by: INTERNAL MEDICINE

## 2024-08-19 NOTE — PROGRESS NOTES
Subjective:     Patient ID: Fiordaliza Leblanc is a 64 year old female.  Presents for follow-up on rash, diabetes    HPI  Patient developed several erythematous itchy bumps on her body, she works outside regularly but she does not do yard work.  Rash is not spreading, she feels tired frequently in general gets enough sleep 6 -7 hours at night.  Checks blood sugar at home states it is running around 110.  She denies chest pain or shortness of breath denies headaches or dizziness  Patient complains of feeling fatigued lately, [place]  Current Outpatient Medications   Medication Sig Dispense Refill    levocetirizine 5 MG Oral Tab Take 1 tablet (5 mg total) by mouth every evening. 90 tablet 0    Blood Glucose Monitoring Suppl Does not apply Kit Check blood sugar once daily. 1 kit 0    Glucose Blood (ONETOUCH VERIO) In Vitro Strip Check blood sugars one time a day 100 strip 3    Lancet Devices (ONETOUCH DELICA PLUS LANCING) Does not apply Misc 1 Lancet by Finger stick route daily. 100 each 3    Glucose Blood (ACCU-CHEK GUIDE) In Vitro Strip 1 each by In Vitro route daily. 100 strip 3    Accu-Chek FastClix Lancets Does not apply Misc Use a directed once daily 100 each 3     Allergies:No Known Allergies    Past Medical History:    Erosive gastritis    History of pregnancy    per NG    Internal hemorrhoids without complication      Past Surgical History:   Procedure Laterality Date          per NG          per NG    Colonoscopy N/A 8/10/2018    Procedure: COLONOSCOPY;  Surgeon: Lorri De León MD;  Location: [facility] ENDOSCOPY    Hysterectomy      per NG      Family History   Problem Relation Age of Onset    Blood Disorder Father         DVT; per NG    Diabetes Sister     Diabetes Brother     Glaucoma Neg     Macular degeneration Neg       Social History:   Social History     Socioeconomic History    Marital status:    Tobacco Use    Smoking status:  Never    Smokeless tobacco: Never    Tobacco comments:     per NG   Vaping Use    Vaping status: Never Used   Substance and Sexual Activity    Alcohol use: No     Comment: per NG    Drug use: No    Sexual activity: Not Currently   Other Topics Concern    Caffeine Concern Yes     Comment: Coffee, 1 cup; per      Social Determinants of Health      Received from [employer]    Food Insecurity   Transportation Needs: No Transportation Needs (8/12/2020)    Received from [facility]    PRAPARE - Transportation     Lack of Transportation (Medical): No     Lack of Transportation (Non-Medical): No   Physical Activity: Inactive (8/5/2020)    Received from [employer] [facility]    Exercise Vital Sign     Days of Exercise per Week: 0 days     Minutes of Exercise per Session: 0 min    Received from [employer] [facility]    Stress        /78 (BP Location: Left arm, Patient Position: Sitting, Cuff Size: large)   Pulse 72   Ht 5' 4\" (1.626 m)   Wt 177 lb (80.3 kg)   BMI 30.38 kg/m²    Physical Exam  Constitutional:       Appearance: Normal appearance.   Cardiovascular:      Rate and Rhythm: Normal rate and regular rhythm.      Heart sounds: No murmur heard.  Pulmonary:      Effort: No respiratory distress.   Musculoskeletal:         General: Normal range of motion.      Cervical back: Normal range of motion and neck supple.      Right lower leg: No edema.      Left lower leg: No edema.   Lymphadenopathy:      Cervical: No cervical adenopathy.   Skin:     General: Skin is warm.      Coloration: Skin is not jaundiced.      Findings: Rash present.      Comments: Papular erythematous raised rash with halo of erythema around not more than 5 mm in size scattered throughout the body consistent with a mite bites   Neurological:      General: No focal deficit present.      Mental Status: She is alert and  oriented to person, place, and time.         Assessment & Plan:   1. Type 2 diabetes mellitus without complication, without long-term current use of insulin (HCC) continue low carbohydrate diet, regular physical activities encouraged, will check labs CBC CMP hemoglobin A1c   2. Encounter for vitamin deficiency screening check vitamin D level treat if necessary   3. Breast cancer screening by mammogram    4. Other fatigue rule out hypothyroidism will check TSH with reflex   5.      Rash and skin eruption nonspecific, due to mite bites, healing or treatment needed to report if pressure spreading and avoid exposure of the body when spends time outside use repellents etc.    Orders Placed This Encounter   Procedures    CBC With Differential With Platelet    Comp Metabolic Panel (14)    Lipid Panel    Hemoglobin A1C    Vitamin D [E]    TSH W Reflex To Free T4       Meds This Visit:  Requested Prescriptions      No prescriptions requested or ordered in this encounter       Imaging & Referrals:  [facility] DRAKE 2D+3D SCREENING BILAT (CPT=77067/36618)

## 2024-10-08 ENCOUNTER — LAB ENCOUNTER (OUTPATIENT)
Dept: LAB | Age: 64
End: 2024-10-08
Attending: INTERNAL MEDICINE
Payer: COMMERCIAL

## 2024-10-08 ENCOUNTER — HOSPITAL ENCOUNTER (OUTPATIENT)
Dept: ULTRASOUND IMAGING | Age: 64
Discharge: HOME | End: 2024-10-08
Attending: INTERNAL MEDICINE
Payer: COMMERCIAL

## 2024-10-08 DIAGNOSIS — E01.0 THYROMEGALY: ICD-10-CM

## 2024-10-08 DIAGNOSIS — Z13.21 ENCOUNTER FOR VITAMIN DEFICIENCY SCREENING: ICD-10-CM

## 2024-10-08 DIAGNOSIS — R53.83 OTHER FATIGUE: ICD-10-CM

## 2024-10-08 DIAGNOSIS — E11.9 TYPE 2 DIABETES MELLITUS WITHOUT COMPLICATION, WITHOUT LONG-TERM CURRENT USE OF INSULIN (HCC): ICD-10-CM

## 2024-10-08 LAB
ALBUMIN SERPL-MCNC: 4.6 G/DL (ref 3.2–4.8)
ALBUMIN/GLOB SERPL: 1.8 {RATIO} (ref 1–2)
ALP LIVER SERPL-CCNC: 75 U/L
ALT SERPL-CCNC: 30 U/L
ANION GAP SERPL CALC-SCNC: 7 MMOL/L (ref 0–18)
AST SERPL-CCNC: 22 U/L (ref ?–34)
BASOPHILS # BLD AUTO: 0.03 X10(3) UL (ref 0–0.2)
BASOPHILS NFR BLD AUTO: 0.5 %
BILIRUB SERPL-MCNC: 0.4 MG/DL (ref 0.2–1.1)
BUN BLD-MCNC: 14 MG/DL (ref 9–23)
BUN/CREAT SERPL: 20.3 (ref 10–20)
CALCIUM BLD-MCNC: 9.5 MG/DL (ref 8.7–10.4)
CHLORIDE SERPL-SCNC: 109 MMOL/L (ref 98–112)
CHOLEST SERPL-MCNC: 208 MG/DL (ref ?–200)
CO2 SERPL-SCNC: 25 MMOL/L (ref 21–32)
CREAT BLD-MCNC: 0.69 MG/DL
DEPRECATED RDW RBC AUTO: 42.7 FL (ref 35.1–46.3)
EGFRCR SERPLBLD CKD-EPI 2021: 98 ML/MIN/1.73M2 (ref 60–?)
EOSINOPHIL # BLD AUTO: 0.17 X10(3) UL (ref 0–0.7)
EOSINOPHIL NFR BLD AUTO: 2.8 %
ERYTHROCYTE [DISTWIDTH] IN BLOOD BY AUTOMATED COUNT: 13.4 % (ref 11–15)
EST. AVERAGE GLUCOSE BLD GHB EST-MCNC: 143 MG/DL (ref 68–126)
FASTING PATIENT LIPID ANSWER: YES
FASTING STATUS PATIENT QL REPORTED: YES
GLOBULIN PLAS-MCNC: 2.6 G/DL (ref 2–3.5)
GLUCOSE BLD-MCNC: 136 MG/DL (ref 70–99)
HBA1C MFR BLD: 6.6 % (ref ?–5.7)
HCT VFR BLD AUTO: 38.7 %
HDLC SERPL-MCNC: 51 MG/DL (ref 40–59)
HGB BLD-MCNC: 12.9 G/DL
IMM GRANULOCYTES # BLD AUTO: 0.01 X10(3) UL (ref 0–1)
IMM GRANULOCYTES NFR BLD: 0.2 %
LDLC SERPL CALC-MCNC: 134 MG/DL (ref ?–100)
LYMPHOCYTES # BLD AUTO: 2.13 X10(3) UL (ref 1–4)
LYMPHOCYTES NFR BLD AUTO: 35.6 %
MCH RBC QN AUTO: 29.1 PG (ref 26–34)
MCHC RBC AUTO-ENTMCNC: 33.3 G/DL (ref 31–37)
MCV RBC AUTO: 87.2 FL
MONOCYTES # BLD AUTO: 0.44 X10(3) UL (ref 0.1–1)
MONOCYTES NFR BLD AUTO: 7.3 %
NEUTROPHILS # BLD AUTO: 3.21 X10 (3) UL (ref 1.5–7.7)
NEUTROPHILS # BLD AUTO: 3.21 X10(3) UL (ref 1.5–7.7)
NEUTROPHILS NFR BLD AUTO: 53.6 %
NONHDLC SERPL-MCNC: 157 MG/DL (ref ?–130)
OSMOLALITY SERPL CALC.SUM OF ELEC: 295 MOSM/KG (ref 275–295)
PLATELET # BLD AUTO: 241 10(3)UL (ref 150–450)
POTASSIUM SERPL-SCNC: 4.2 MMOL/L (ref 3.5–5.1)
PROT SERPL-MCNC: 7.2 G/DL (ref 5.7–8.2)
RBC # BLD AUTO: 4.44 X10(6)UL
SODIUM SERPL-SCNC: 141 MMOL/L (ref 136–145)
TRIGL SERPL-MCNC: 130 MG/DL (ref 30–149)
TSI SER-ACNC: 2.01 MIU/ML (ref 0.55–4.78)
VIT D+METAB SERPL-MCNC: 26.8 NG/ML (ref 30–100)
VLDLC SERPL CALC-MCNC: 24 MG/DL (ref 0–30)
WBC # BLD AUTO: 6 X10(3) UL (ref 4–11)

## 2024-10-08 PROCEDURE — 80061 LIPID PANEL: CPT

## 2024-10-08 PROCEDURE — 82306 VITAMIN D 25 HYDROXY: CPT

## 2024-10-08 PROCEDURE — 83036 HEMOGLOBIN GLYCOSYLATED A1C: CPT

## 2024-10-08 PROCEDURE — 80053 COMPREHEN METABOLIC PANEL: CPT

## 2024-10-08 PROCEDURE — 36415 COLL VENOUS BLD VENIPUNCTURE: CPT

## 2024-10-08 PROCEDURE — 84443 ASSAY THYROID STIM HORMONE: CPT

## 2024-10-08 PROCEDURE — 85025 COMPLETE CBC W/AUTO DIFF WBC: CPT

## 2024-10-08 PROCEDURE — 76536 US EXAM OF HEAD AND NECK: CPT | Performed by: INTERNAL MEDICINE

## 2024-11-05 ENCOUNTER — OFFICE VISIT (OUTPATIENT)
Dept: INTERNAL MEDICINE CLINIC | Facility: CLINIC | Age: 64
End: 2024-11-05

## 2024-11-05 VITALS
WEIGHT: 179.81 LBS | HEIGHT: 64 IN | HEART RATE: 73 BPM | BODY MASS INDEX: 30.7 KG/M2 | DIASTOLIC BLOOD PRESSURE: 78 MMHG | SYSTOLIC BLOOD PRESSURE: 117 MMHG

## 2024-11-05 DIAGNOSIS — E11.9 TYPE 2 DIABETES MELLITUS WITHOUT COMPLICATION, WITHOUT LONG-TERM CURRENT USE OF INSULIN (HCC): Primary | ICD-10-CM

## 2024-11-05 DIAGNOSIS — H93.13 TINNITUS OF BOTH EARS: ICD-10-CM

## 2024-11-05 PROCEDURE — 99214 OFFICE O/P EST MOD 30 MIN: CPT | Performed by: INTERNAL MEDICINE

## 2024-11-05 RX ORDER — CYCLOBENZAPRINE HCL 5 MG
TABLET ORAL
Qty: 10 TABLET | Refills: 0 | Status: SHIPPED | OUTPATIENT
Start: 2024-11-05

## 2024-11-05 NOTE — PROGRESS NOTES
Subjective:     Patient ID: Fiordaliza Leblanc is a 64 year old female.  [facility] for evaluation of tinnitus, follow-up on diabetes, fatigue  HPI  Patient reports that she is feeling unmotivated, does not want to do anything at home, walks every day, tries to eat low carbohydrate diet.  Last hemoglobin A1c 6.6, she has no headache, no visual disturbance.    Bothered by bilateral tinnitus, saw physician in [place] no suggestions were made, last 2 weeks experience dizziness if she pushes on the bone behind the ear.  This is happens if she presses behind the ear on both sides.  Her insurance changed and now she will be scheduling mammogram    Current Outpatient Medications   Medication Sig Dispense Refill    cyclobenzaprine 5 MG Oral Tab TAKE  1 TAB PO AT BEDTIME 10 tablet 0    levocetirizine 5 MG Oral Tab Take 1 tablet (5 mg total) by mouth every evening. 90 tablet 0    Blood Glucose Monitoring Suppl Does not apply Kit Check blood sugar once daily. 1 kit 0    Glucose Blood (ONETOUCH VERIO) In Vitro Strip Check blood sugars one time a day 100 strip 3    Lancet Devices (VividCortexUCH DELICA PLUS LANCING) Does not apply Misc 1 Lancet by Finger stick route daily. 100 each 3    Glucose Blood (ACCU-CHEK GUIDE) In Vitro Strip 1 each by In Vitro route daily. 100 strip 3    Accu-Chek FastClix Lancets Does not apply Misc Use a directed once daily 100 each 3     Allergies:Allergies[1]    Past Medical History:    Erosive gastritis    History of pregnancy    per NG    Internal hemorrhoids without complication      Past Surgical History:   Procedure Laterality Date          per NG          per NG    Colonoscopy N/A 8/10/2018    Procedure: COLONOSCOPY;  Surgeon: Lorri De León MD;  Location: [facility] ENDOSCOPY    Hysterectomy      per NG      Family History   Problem Relation Age of Onset    Blood Disorder Father         DVT; per NG    Diabetes Sister     Diabetes Brother     Glaucoma Neg     Macular  degeneration Neg       Social History:   Social History     Socioeconomic History    Marital status:    Tobacco Use    Smoking status: Never    Smokeless tobacco: Never    Tobacco comments:     per NG   Vaping Use    Vaping status: Never Used   Substance and Sexual Activity    Alcohol use: No     Comment: per NG    Drug use: No    Sexual activity: Not Currently   Other Topics Concern    Caffeine Concern Yes     Comment: Coffee, 1 cup; per NG     Social Drivers of Health      Received from [employer]    Food Insecurity   Transportation Needs: No Transportation Needs (8/12/2020)    Received from [employer]    PRAPARE - Transportation     Lack of Transportation (Medical): No     Lack of Transportation (Non-Medical): No   Physical Activity: Inactive (8/5/2020)    Received from [employer]    Exercise Vital Sign     Days of Exercise per Week: 0 days     Minutes of Exercise per Session: 0 min    Received from [employer]    Stress        /78 (BP Location: Left arm, Patient Position: Sitting, Cuff Size: adult)   Pulse 73   Ht 5' 4\" (1.626 m)   Wt 179 lb 12.8 oz (81.6 kg)   BMI 30.86 kg/m²    Physical Exam  Constitutional:       Appearance: Normal appearance.   HENT:      Head: Normocephalic and atraumatic.      Right Ear: Tympanic membrane, ear canal and external ear normal.      Left Ear: Tympanic membrane, ear canal and external ear normal.   Eyes:      Extraocular Movements: Extraocular movements intact.      Conjunctiva/sclera: Conjunctivae normal.      Pupils: Pupils are equal, round, and reactive to light.   Cardiovascular:      Rate and Rhythm: Normal rate and regular rhythm.      Heart sounds: No murmur heard.     No gallop.   Pulmonary:      Effort: Pulmonary effort is normal.      Breath sounds: Normal breath sounds. No wheezing or rhonchi.    Musculoskeletal:      Cervical back: Normal range of motion and neck supple.   Neurological:      General: No focal deficit present.      Mental Status: She is alert and oriented to person, place, and time. Mental status is at baseline.   Psychiatric:         Mood and Affect: Mood normal.         Behavior: Behavior normal.         Assessment & Plan:   1. Tinnitus of both ears referred patient to see ENT specialist, meanwhile she will try cyclobenzaprine 5 mg at bedtime check labs    2. Type 2 diabetes mellitus without complication, without long-term current use of insulin (HCC)    Continue low carbohydrate diet regular physical activities will check CMP and hemoglobin A1c in 3 months    Orders Placed This Encounter   Procedures    Comp Metabolic Panel (14)    Hemoglobin A1C       Meds This Visit:  Requested Prescriptions     Signed Prescriptions Disp Refills    cyclobenzaprine 5 MG Oral Tab 10 tablet 0     Sig: TAKE  1 TAB PO AT BEDTIME       Imaging & Referrals:  ENT - INTERNAL            [1] No Known Allergies

## 2024-11-20 DIAGNOSIS — R73.03 PREDIABETES: ICD-10-CM

## 2024-11-20 NOTE — TELEPHONE ENCOUNTER
Patient's  calling for refill for     Glucose Blood (ONETOUCH VERIO) In Vitro Strip     [id number] - [place], [place] - [address] [phone], [phone]

## 2024-11-25 DIAGNOSIS — R73.03 PREDIABETES: ICD-10-CM

## 2024-11-25 RX ORDER — BLOOD SUGAR DIAGNOSTIC
1 STRIP MISCELLANEOUS DAILY
Qty: 100 STRIP | Refills: 3 | Status: SHIPPED | OUTPATIENT
Start: 2024-11-25

## 2024-11-25 RX ORDER — LANCETS 33 GAUGE
1 EACH MISCELLANEOUS DAILY
Qty: 100 EACH | Refills: 3 | Status: SHIPPED | OUTPATIENT
Start: 2024-11-25

## 2024-11-25 NOTE — TELEPHONE ENCOUNTER
Please review.  Protocol failed / Has no protocol.    New brand for lancets needs Dr. Thompson signature please     Requested Prescriptions   Pending Prescriptions Disp Refills    Glucose Blood (ONETOUCH VERIO) In Vitro Strip 100 strip 3     Si strip by In Vitro route daily. Check blood sugars one time a day       Diabetic Supplies Protocol Passed - 2024  2:40 PM        Passed - In person appointment or virtual visit in the past 12 mos or appointment in next 3 mos     Recent Outpatient Visits              2 weeks ago Type 2 diabetes mellitus without complication, without long-term current use of insulin ([facility])    [facility] [address], [place] Carmina Thompson MD    Office Visit    3 months ago Type 2 diabetes mellitus without complication, without long-term current use of insulin ([facility])    [facility] [place] Kandel, Ninel, MD    Office Visit    7 months ago Type 2 diabetes mellitus without complication, without long-term current use of insulin ([facility]    10 months ago Prediabetes    [facility] Ignacio Lan MD    Office Visit    11 months ago [facility]                        Lancets (ONETOUCH DELICA PLUS [id number]) Does not apply Misc 100 each 3     Si Lancet by Finger stick route daily.       Diabetic Supplies Protocol Passed - 2024  2:40 PM        Passed - In person appointment or virtual visit in the past 12 mos or appointment in next 3 mos     Recent Outpatient Visits              2 weeks ago Type 2 diabetes mellitus without complication, without long-term current use of insulin ([facility])    [facility]    [facility]    3 months ago Type 2 diabetes mellitus without  complication, without long-term current use of insulin ([facility]    [facility]    7 months ago Type 2 diabetes mellitus without complication, without long-term current use of insulin ([facility])    [facility], [facility] [place] Kandel, Ninel, MD    Office Visit    10 months ago Prediabetes    [facility], Ignacio Lan MD    Office Visit    11 months ago [facility]                           Recent Outpatient Visits              2 weeks ago Type 2 diabetes mellitus without complication, without long-term current use of insulin ([facility]    3 months ago Type 2 diabetes mellitus without complication, without long-term current use of insulin ([facility])    [facility]    [facility] Visit    7 months ago Type 2 diabetes mellitus without complication, without long-term current use of insulin ([facility])    [facility]    Office Visit    10 months ago Prediabetes    [facility], Ignacio Lan MD    Office Visit    11 months ago Prediabetes    [facility] [place] Kandel, Ninel, MD    Office Visit

## 2024-11-25 NOTE — TELEPHONE ENCOUNTER
Per son of patient, patient is totally out of strips, please send to her pharmacy [facility] on file verified.    Current Outpatient Medications   Medication Sig Dispense Refill                         Glucose Blood (ONETOUCH VERIO) In Vitro Strip Check blood sugars one time a day 100 strip 3

## 2024-11-28 RX ORDER — BLOOD SUGAR DIAGNOSTIC
STRIP MISCELLANEOUS
Qty: 100 STRIP | Refills: 2 | Status: SHIPPED | OUTPATIENT
Start: 2024-11-28

## 2024-11-28 NOTE — TELEPHONE ENCOUNTER
Refill passed per [facility] protocol.     Requested Prescriptions   Pending Prescriptions Disp Refills    ONETOUCH VERIO In Vitro Strip [Pharmacy Med Name: Onetouch Verio Test Strips [facility]] 100 strip 0     Sig: TEST BLOOD SUGARS ONCE DAILY       Diabetic Supplies Protocol Passed - 11/28/2024 11:17 AM        Passed - In person appointment or virtual visit in the past 12 mos or appointment in next 3 mos     Recent Outpatient Visits              3 weeks ago Type 2 diabetes mellitus without complication, without long-term current use of insulin ([facility])    [facility] [place] Kandel, Ninel, MD    Office Visit    3 months ago Type 2 diabetes mellitus without complication, without long-term current use of insulin ([facility])    [facility] [place] Kandel, Ninel, MD    Office Visit    7 months ago Type 2 diabetes mellitus without complication, without long-term current use of insulin ([facility])    [facility] [place] Kandel, Ninel, MD    Office Visit    10 months ago Prediabetes    [facility] Ignacio Allen MD    Office Visit    11 months ago Prediabetes    [facility] [place] Kandel, Ninel, MD    Office Visit

## 2025-03-27 ENCOUNTER — LAB ENCOUNTER (OUTPATIENT)
Dept: LAB | Age: 65
End: 2025-03-27
Attending: INTERNAL MEDICINE
Payer: COMMERCIAL

## 2025-03-27 DIAGNOSIS — E11.9 TYPE 2 DIABETES MELLITUS WITHOUT COMPLICATION, WITHOUT LONG-TERM CURRENT USE OF INSULIN (HCC): ICD-10-CM

## 2025-03-27 LAB
ALBUMIN SERPL-MCNC: 4.4 G/DL (ref 3.2–4.8)
ALBUMIN/GLOB SERPL: 1.6 {RATIO} (ref 1–2)
ALP LIVER SERPL-CCNC: 89 U/L
ALT SERPL-CCNC: 18 U/L
ANION GAP SERPL CALC-SCNC: 6 MMOL/L (ref 0–18)
AST SERPL-CCNC: 18 U/L (ref ?–34)
BILIRUB SERPL-MCNC: 0.4 MG/DL (ref 0.2–1.1)
BUN BLD-MCNC: 14 MG/DL (ref 9–23)
BUN/CREAT SERPL: 18.9 (ref 10–20)
CALCIUM BLD-MCNC: 9.1 MG/DL (ref 8.7–10.4)
CHLORIDE SERPL-SCNC: 106 MMOL/L (ref 98–112)
CO2 SERPL-SCNC: 28 MMOL/L (ref 21–32)
CREAT BLD-MCNC: 0.74 MG/DL
EGFRCR SERPLBLD CKD-EPI 2021: 91 ML/MIN/1.73M2 (ref 60–?)
EST. AVERAGE GLUCOSE BLD GHB EST-MCNC: 143 MG/DL (ref 68–126)
FASTING STATUS PATIENT QL REPORTED: YES
GLOBULIN PLAS-MCNC: 2.7 G/DL (ref 2–3.5)
GLUCOSE BLD-MCNC: 116 MG/DL (ref 70–99)
HBA1C MFR BLD: 6.6 % (ref ?–5.7)
OSMOLALITY SERPL CALC.SUM OF ELEC: 291 MOSM/KG (ref 275–295)
POTASSIUM SERPL-SCNC: 4.3 MMOL/L (ref 3.5–5.1)
PROT SERPL-MCNC: 7.1 G/DL (ref 5.7–8.2)
SODIUM SERPL-SCNC: 140 MMOL/L (ref 136–145)

## 2025-03-27 PROCEDURE — 83036 HEMOGLOBIN GLYCOSYLATED A1C: CPT

## 2025-03-27 PROCEDURE — 80053 COMPREHEN METABOLIC PANEL: CPT

## 2025-03-27 PROCEDURE — 36415 COLL VENOUS BLD VENIPUNCTURE: CPT

## 2025-07-16 ENCOUNTER — HOSPITAL ENCOUNTER (EMERGENCY)
Facility: HOSPITAL | Age: 65
Discharge: HOME | End: 2025-07-17
Attending: EMERGENCY MEDICINE
Payer: COMMERCIAL

## 2025-07-16 ENCOUNTER — APPOINTMENT (OUTPATIENT)
Dept: GENERAL RADIOLOGY | Facility: HOSPITAL | Age: 65
End: 2025-07-16
Attending: EMERGENCY MEDICINE
Payer: COMMERCIAL

## 2025-07-16 VITALS
SYSTOLIC BLOOD PRESSURE: 145 MMHG | DIASTOLIC BLOOD PRESSURE: 87 MMHG | TEMPERATURE: 98 F | BODY MASS INDEX: 31 KG/M2 | OXYGEN SATURATION: 96 % | WEIGHT: 180 LBS | RESPIRATION RATE: 16 BRPM | HEART RATE: 66 BPM

## 2025-07-16 DIAGNOSIS — M25.562 ACUTE PAIN OF LEFT KNEE: Primary | ICD-10-CM

## 2025-07-16 PROCEDURE — 73560 X-RAY EXAM OF KNEE 1 OR 2: CPT | Performed by: EMERGENCY MEDICINE

## 2025-07-16 PROCEDURE — 99283 EMERGENCY DEPT VISIT LOW MDM: CPT

## 2025-07-16 PROCEDURE — 99284 EMERGENCY DEPT VISIT MOD MDM: CPT

## 2025-07-16 RX ORDER — IBUPROFEN 600 MG/1
600 TABLET, FILM COATED ORAL ONCE
Status: DISCONTINUED | OUTPATIENT
Start: 2025-07-16 | End: 2025-07-16

## 2025-07-16 RX ORDER — HYDROCODONE BITARTRATE AND ACETAMINOPHEN 5; 325 MG/1; MG/1
1 TABLET ORAL ONCE
Refills: 0 | Status: COMPLETED | OUTPATIENT
Start: 2025-07-16 | End: 2025-07-16

## 2025-07-16 RX ORDER — IBUPROFEN 600 MG/1
600 TABLET, FILM COATED ORAL ONCE
Status: COMPLETED | OUTPATIENT
Start: 2025-07-16 | End: 2025-07-16

## 2025-07-17 RX ORDER — HYDROCODONE BITARTRATE AND ACETAMINOPHEN 5; 325 MG/1; MG/1
1 TABLET ORAL EVERY 6 HOURS PRN
Qty: 16 TABLET | Refills: 0 | Status: SHIPPED | OUTPATIENT
Start: 2025-07-17 | End: 2025-07-24

## 2025-07-17 RX ORDER — IBUPROFEN 600 MG/1
600 TABLET, FILM COATED ORAL EVERY 8 HOURS PRN
Qty: 30 TABLET | Refills: 0 | Status: SHIPPED | OUTPATIENT
Start: 2025-07-17 | End: 2025-07-24

## 2025-07-17 NOTE — ED PROVIDER NOTES
Patient Seen in: [facility] Emergency Department    History     Chief Complaint   Patient presents with    Leg Pain    Knee Pain       HPI    65-year-old female presents to the ER for evaluation of left knee and leg pain.  Patient states that she feels like her knee is swollen and she has increased pain with ambulation as well as if she tries to fully bend her knee.  She states that she feels like she has radiating pain to her hip and down her leg.  No numbness or tingling.  She has tried Tylenol with no relief.  Symptoms have been ongoing for about 1 week but have gotten worse in the last few days.  There has been no trauma or falls.    History from Independent Source:  is at bedside and states that she has been taking ibuprofen as well as pain relief cream applied to her knee    External Records Reviewed: On chart review, patient last saw her primary care and November.  She has a history of diabetes and gastritis.    History reviewed. Past Medical History[1]    History reviewed. Past Surgical History[2]      Medications :  Prescriptions Prior to Admission[3]     Family History[4]    Smoking Status: Social Hx on file[5]    Constitutional and vital signs reviewed.      Social History and Family History elements reviewed from today, pertinent positives to the presenting problem noted.    Physical Exam     ED Triage Vitals [07/16/25 2131]   /87   Pulse 66   Resp 16   Temp 98.1 °F (36.7 °C)   Temp src Temporal   SpO2 96 %   O2 Device None (Room air)       Physical Exam   Constitutional: AAOx3, well nourished, NAD  HEENT: Normocephalic, PERRLA, MMM  CV: s1s2+, RRR, no m/r/g, normal distal pulses  Pulmonary/Chest: CTA b/l with no rales, wheezes.  No chest wall tenderness  Abdominal: Nontender.  Nondistended. Soft. Bowel sounds are normal.   Neck/Back:   :   Musculoskeletal: Left lower extremity with normal range of motion. No deformity.  Patient has small joint effusion.  No erythema.  2+ DP  pulse  Neurological: Awake, alert. Normal reflexes. No cranial nerve deficit.    Skin: Skin is warm and dry. No rash noted. No erythema.   Psychiatric:      All measures to prevent infection transmission during my interaction with the patient were taken. The patient was already wearing a droplet mask on my arrival to the room. Personal protective equipment was worn throughout the duration of the exam.      ED Course      Labs Reviewed - No data to display  My Independent Interpretation of EKG (if performed):       Imaging Results Available and Reviewed while in ED: No results found.  ED Medications Administered:   Medications   HYDROcodone-acetaminophen (Norco) 5-325 MG per tab 1 tablet (1 tablet Oral Given 7/16/25 2329)   ibuprofen (Motrin) tab 600 mg (600 mg Oral Given 7/16/25 2329)             MDM     Vitals:    07/16/25 2131   BP: 145/87   Pulse: 66   Resp: 16   Temp: 98.1 °F (36.7 °C)   TempSrc: Temporal   SpO2: 96%   Weight: 81.6 kg     *I personally reviewed and interpreted all ED vitals.    Independent Interpretation of Studies: I have independently reviewed patient's left knee x-ray and there are no significant acute abnormalities    Social Determinants of Health:     Procedures:      Differential/MDM/Shared Decision Making: Differential Diagnosis includes arthritis, sciatica, cellulitis, gout, DVT, others.      The patient already  has a past medical history of Erosive gastritis (3/7/2019), History of pregnancy (1998, 1990), and Internal hemorrhoids without complication (8/10/2018).  to contribute to the complexity of this ED evaluation.           Medications, Diagnostics, or Disposition considered but not done:     Patient may be experiencing some mild flare of knee arthritis.  Management of case was discussed with patient and .  Will discharge on pain medications.  They are comfortable discharge plan.      Condition upon leaving the department: Stable    Disposition and Plan     Clinical  Impression:  1. Acute pain of left knee        Disposition:  Discharge    Follow-up:  Carmina Thompson MD  [address] [place] [address]  [phone]    Call in 2 day(s)        Medications Prescribed:  Current Discharge Medication List        START taking these medications    Details   ibuprofen 600 MG Oral Tab Take 1 tablet (600 mg total) by mouth every 8 (eight) hours as needed for Pain or Fever.  Qty: 30 tablet, Refills: 0    Associated Diagnoses: Acute pain of left knee      HYDROcodone-acetaminophen 5-325 MG Oral Tab Take 1 tablet by mouth every 6 (six) hours as needed for Pain.  Qty: 16 tablet, Refills: 0    Associated Diagnoses: Acute pain of left knee                      [1]   Past Medical History:   Erosive gastritis    History of pregnancy    per NG    Internal hemorrhoids without complication   [2]   Past Surgical History:  Procedure Laterality Date          per NG          per NG    Colonoscopy N/A 8/10/2018    Procedure: COLONOSCOPY;  Surgeon: Lorri De León MD;  Location: [facility] ENDOSCOPY    Hysterectomy      per NG   [3] (Not in a hospital admission)   [4]   Family History  Problem Relation Age of Onset    Blood Disorder Father         DVT; per NG    Diabetes Sister     Diabetes Brother     Glaucoma Neg     Macular degeneration Neg    [5]   Social History  Socioeconomic History    Marital status:    Tobacco Use    Smoking status: Never    Smokeless tobacco: Never    Tobacco comments:     per NG   Vaping Use    Vaping status: Never Used   Substance and Sexual Activity    Alcohol use: No     Comment: per NG    Drug use: No    Sexual activity: Not Currently   Other Topics Concern    Caffeine Concern Yes     Comment: Coffee, 1 cup; per NG

## 2025-07-17 NOTE — ED INITIAL ASSESSMENT (HPI)
Pt arrives ambulatory to ED for c/o L leg pain and L knee pain/swelling since yesterday. Aox4, speaking in full sentences.

## 2025-07-30 ENCOUNTER — OFFICE VISIT (OUTPATIENT)
Dept: INTERNAL MEDICINE CLINIC | Facility: CLINIC | Age: 65
End: 2025-07-30

## 2025-07-30 VITALS
DIASTOLIC BLOOD PRESSURE: 83 MMHG | HEART RATE: 72 BPM | HEIGHT: 64 IN | SYSTOLIC BLOOD PRESSURE: 121 MMHG | WEIGHT: 186 LBS | BODY MASS INDEX: 31.76 KG/M2

## 2025-07-30 DIAGNOSIS — M25.561 CHRONIC PAIN OF RIGHT KNEE: ICD-10-CM

## 2025-07-30 DIAGNOSIS — E11.9 TYPE 2 DIABETES MELLITUS WITHOUT COMPLICATION, WITHOUT LONG-TERM CURRENT USE OF INSULIN (HCC): ICD-10-CM

## 2025-07-30 DIAGNOSIS — G89.29 CHRONIC PAIN OF RIGHT KNEE: ICD-10-CM

## 2025-07-30 DIAGNOSIS — Z12.31 BREAST CANCER SCREENING BY MAMMOGRAM: ICD-10-CM

## 2025-07-30 DIAGNOSIS — M25.551 PAIN OF RIGHT HIP: ICD-10-CM

## 2025-07-30 DIAGNOSIS — Z13.21 ENCOUNTER FOR VITAMIN DEFICIENCY SCREENING: ICD-10-CM

## 2025-07-30 DIAGNOSIS — Z00.00 PHYSICAL EXAM, ROUTINE: Primary | ICD-10-CM

## 2025-07-30 PROCEDURE — 99396 PREV VISIT EST AGE 40-64: CPT | Performed by: INTERNAL MEDICINE

## 2025-08-13 ENCOUNTER — TELEPHONE (OUTPATIENT)
Dept: PHYSICAL MEDICINE AND REHAB | Facility: CLINIC | Age: 65
End: 2025-08-13

## 2025-08-13 ENCOUNTER — OFFICE VISIT (OUTPATIENT)
Dept: PHYSICAL MEDICINE AND REHAB | Facility: CLINIC | Age: 65
End: 2025-08-13

## 2025-08-13 VITALS — HEIGHT: 64 IN | BODY MASS INDEX: 31.76 KG/M2 | WEIGHT: 186 LBS

## 2025-08-13 DIAGNOSIS — K29.60 EROSIVE GASTRITIS: ICD-10-CM

## 2025-08-13 DIAGNOSIS — M17.12 PRIMARY OSTEOARTHRITIS OF LEFT KNEE: Primary | ICD-10-CM

## 2025-08-13 PROCEDURE — 99214 OFFICE O/P EST MOD 30 MIN: CPT | Performed by: PHYSICAL MEDICINE & REHABILITATION

## 2025-08-13 PROCEDURE — 20610 DRAIN/INJ JOINT/BURSA W/O US: CPT | Performed by: PHYSICAL MEDICINE & REHABILITATION

## 2025-08-13 RX ORDER — LIDOCAINE HYDROCHLORIDE 10 MG/ML
4 INJECTION, SOLUTION INFILTRATION; PERINEURAL ONCE
Status: COMPLETED | OUTPATIENT
Start: 2025-08-13 | End: 2025-08-13

## 2025-08-13 RX ORDER — TRIAMCINOLONE ACETONIDE 40 MG/ML
80 INJECTION, SUSPENSION INTRA-ARTICULAR; INTRAMUSCULAR ONCE
Status: COMPLETED | OUTPATIENT
Start: 2025-08-13 | End: 2025-08-13

## (undated) DEVICE — 6 ML SYRINGE LUER-LOCK TIP: Brand: MONOJECT

## (undated) DEVICE — SYRINGE/GUAGE ASSEMBLY

## (undated) DEVICE — FORCEP RADIAL JAW 4

## (undated) DEVICE — 3 ML SYRINGE LUER-LOCK TIP: Brand: MONOJECT

## (undated) DEVICE — MASK PROC MASK SOFT WHITE

## (undated) DEVICE — Device: Brand: DEFENDO AIR/WATER/SUCTION AND BIOPSY VALVE

## (undated) DEVICE — STERILE LATEX POWDER-FREE SURGICAL GLOVESWITH NITRILE COATING: Brand: PROTEXIS

## (undated) DEVICE — CONMED SCOPE SAVER BITE BLOCK, 20X27 MM: Brand: SCOPE SAVER

## (undated) DEVICE — MEDI-VAC NON-CONDUCTIVE SUCTION TUBING 6MM X 1.8M (6FT.) L: Brand: CARDINAL HEALTH

## (undated) DEVICE — ENDOSCOPY PACK - LOWER: Brand: MEDLINE INDUSTRIES, INC.

## (undated) DEVICE — Device: Brand: CUSTOM PROCEDURE KIT

## (undated) DEVICE — LINE MNTR ADLT SET O2 INTMD

## (undated) NOTE — LETTER
02/25/19        Fiordaliza Leblanc  [id number] [address]      Dear Radha Clarity records indicate that you have outstanding lab work and or testing that was ordered for you and has not yet been completed:  Orders Placed This Encounter

## (undated) NOTE — LETTER
October 4, 2018         Kate Bazan MD  [facility]      Patient: Adeola Rausch   YOB: 1960   Date of Visit: 10/4/2018       Dear Dr. Zena Tsang MD,    I saw your patient, Adeola Rausch, on 10/4/2018.  Blanca

## (undated) NOTE — LETTER
05/05/20        Fiordaliza Leblanc  [id number] [address]      Dear Cindra Fleischer records indicate that you have outstanding lab work and or testing that was ordered for you and has not yet been completed:    H. Pylori, Stool    To provide y

## (undated) NOTE — LETTER
08/07/18        Fiordaliza SANDHU bailey  [id number] [address]      Dear Kennedy Webb records indicate that you have outstanding lab work and or testing that was ordered for you and has not yet been completed:    H-PYLORI STOOL AG,EIA      To pr

## (undated) NOTE — LETTER
4/26/2019          To Whom It May Concern:    Fiordaliza Leblanc was seen in the office on 04/23/9 and will return back to work today 04/26/19. If you require additional information please contact our office.         Sincerely,    Eric Roger MD

## (undated) NOTE — LETTER
No referring provider defined for this encounter. 03/06/18        Patient: Luis Griffin   YOB: 1960   Date of Visit: 3/6/2018       Dear  Dr. Charity Prince Recipients,      Thank you for referring Luis Griffin to my practice.   Please find my a

## (undated) NOTE — LETTER
March 5, 2018     [facility] [address]      Dear Ta Day:    Below are the results from your recent visit:    Your kidney and liver function are normal, and you have no anemia.  Your cholesterol is mildly elevated,    Your MCH 27.6 27.0 - 32.0 pg    MCHC 32.9 32.0 - 37.0 g/dl    RDW 13.8 11.0 - 15.0 %     140 - 400 K/UL    MPV 8.9 7.4 - 10.3 fL    Neutrophil % 61 %    Lymphocyte % 30 %    Monocyte % 6 %    Eosinophil % 2 %    Basophil % 0 %    Neutrophil Absolute 4. 2

## (undated) NOTE — LETTER
4/23/2019          To Whom It May Concern:    Rubénjed EDSON Leblanc was seen in the office today and will be off work for 2 days. Allan Sevilla will return back to work on 04/25/19. If you require additional information please contact our office.         Sincerely,    Herve

## (undated) NOTE — ED AVS SNAPSHOT
Ms. José Miguel Vigil   MRN: [id number]    Department:  [facility] Emergency Department   Date of Visit:  4/9/2019           Disclosure     Insurance plans vary and the physician(s) referred by the ER may not be covered by your plan.  Please contact CARE PHYSICIAN AT ONCE OR RETURN IMMEDIATELY TO THE EMERGENCY DEPARTMENT. If you have been prescribed any medication(s), please fill your prescription right away and begin taking the medication(s) as directed.   If you believe that any of the medications

## (undated) NOTE — ED AVS SNAPSHOT
Ms. Gayle Kussmaul   MRN: [id number]    Department:  [facility] Emergency Department   Date of Visit:  3/20/2018           Disclosure     Insurance plans vary and the physician(s) referred by the ER may not be covered by your plan.  Please contact CARE PHYSICIAN AT ONCE OR RETURN IMMEDIATELY TO THE EMERGENCY DEPARTMENT. If you have been prescribed any medication(s), please fill your prescription right away and begin taking the medication(s) as directed.   If you believe that any of the medications

## (undated) NOTE — LETTER
Date & Time: 4/9/2019, 3:40 AM  Patient: Fuad Part  Encounter Provider(s): Kiran Nolan MD       To Whom It May Concern:    Luis Griffin was seen and treated in our department on 4/9/2019. She may return to work 4/11/2019.     If you have any ques

## (undated) NOTE — LETTER
09/07/18        Fiordaliza Leblanc  [id number] [address]      Dear Nandini Sanchez records indicate that you have outstanding lab work and or testing that was ordered for you and has not yet been completed:    H-PYLORI STOOL AG,EIA    To prov

## (undated) NOTE — LETTER
2024      No Recipients     Patient: Fiordaliza Leblanc   YOB: 1960   Date of Visit: 2024       Dear Dr. De Leon Recipients:    Thank you for referring Fiordaliza Leblanc to me for evaluation. Here is my assessment and plan of care:    Fiordaliza Leblanc is a 63 year old female.    HPI:     HPI    Patient is here for a diabetic exam. She states vision is stable and has no complaints. She would like an updated rx today.    Pt has been a pre diabetic for 2 years       Pt's diabetes is currently controlled by diet  Pt rarely checks BS   Pt's last blood sugar was  119 on 23  Last HA1C was 6.5 on 23  Endocrinologist: none     Referred by Dr. Thompson  Last edited by Rehana Cummins OT on 2024  3:24 PM.        Patient History:  Past Medical History:   Diagnosis Date    Erosive gastritis 3/7/2019    History of pregnancy ,     per NG    Internal hemorrhoids without complication 8/10/2018       Surgical History: Fiordaliza Leblanc has a past surgical history that includes  () (per NG);  () (per NG); hysterectomy () (per NG); and colonoscopy (N/A, 8/10/2018) (Procedure: COLONOSCOPY;  Surgeon: Lorri De León MD;  Location: [facility] ENDOSCOPY).    Family History   Problem Relation Age of Onset    Blood Disorder Father         DVT; per NG    Diabetes Sister     Diabetes Brother     Glaucoma Neg     Macular degeneration Neg        Social History:   Social History     Socioeconomic History    Marital status:    Tobacco Use    Smoking status: Never    Smokeless tobacco: Never    Tobacco comments:     per NG   Vaping Use    Vaping Use: Never used   Substance and Sexual Activity    Alcohol use: No     Comment: per NG    Drug use: No    Sexual activity: Not Currently   Other Topics Concern    Caffeine Concern Yes     Comment: Coffee, 1 cup; per NG       Medications:  Current Outpatient Medications   Medication Sig Dispense Refill    amoxicillin 875 MG Oral Tab  Take 1 tablet (875 mg total) by mouth 2 (two) times daily. 14 tablet 0    cyclobenzaprine 5 MG Oral Tab Take 1 tab po at bedtime 15 tablet 0    Blood Glucose Monitoring Suppl Does not apply Kit Check blood sugar once daily. 1 kit 0    Glucose Blood (ONETOUCH VERIO) In Vitro Strip Check blood sugars one time a day 100 strip 3    Lancet Devices (ONETOUCH DELICA PLUS LANCING) Does not apply Misc 1 Lancet by Finger stick route daily. 100 each 3    Glucose Blood (ACCU-CHEK GUIDE) In Vitro Strip 1 each by In Vitro route daily. 100 strip 3    Accu-Chek FastClix Lancets Does not apply Misc Use a directed once daily 100 each 3    EMGALITY 120 MG/ML Subcutaneous Solution Auto-injector 1 each daily.         Allergies:  No Known Allergies    ROS:       PHYSICAL EXAM:     Base Eye Exam       Visual Acuity (Snellen - Linear)         Right Left    Dist sc 20/25 20/25 -3    Near sc 20/20 20/25              Tonometry (Icare, 3:40 PM)         Right Left    Pressure 14 13              Pupils         Pupils    Right PERRL    Left PERRL              Visual Fields         Left Right     Full Full              Extraocular Movement         Right Left     Full, Ortho Full, Ortho              Neuro/Psych       Oriented x3: Yes              Dilation       Both eyes: 1.0% Mydriacyl and 2.5% Volodymyr Synephrine @ 3:41 PM              Dilation #2       Both eyes: 1.0% Mydriacyl and 2.5% Volodymyr Synephrine @ 3:42 PM                  Slit Lamp and Fundus Exam       Slit Lamp Exam         Right Left    Lids/Lashes Dermatochalasis, Meibomian gland dysfunction Dermatochalasis, Meibomian gland dysfunction    Conjunctiva/Sclera Normal Nasal pinguecula    Cornea Clear Clear    Anterior Chamber Deep and quiet Deep and quiet    Iris Normal Normal    Lens 1+ Nuclear sclerosis, 1+ anterior and posterior cortical cataract nasally 1+ Nuclear sclerosis, trace anterior cortical cataract nasally    Vitreous Clear Vitreous floaters              Fundus Exam         Right  Left    Disc Good rim, Temporal crescent Good rim, Temporal crescent    C/D Ratio 0.25 0.3    Macula Normal-no BDR Normal-no BDR    Vessels Normal Normal    Periphery Normal Normal                  Refraction       Wearing Rx    Patient wears glasses for driving but forgot to bring them in.             Manifest Refraction (Auto)         Sphere Cylinder Axis Dist VA    Right -0.75 +0.75 140     Left -1.00 +0.50 120               Manifest Refraction #2         Sphere Cylinder Beals Dist VA    Right -1.00 +0.75 140 20/20    Left -1.00 +0.75 120 20/20-              Final Rx         Sphere Cylinder Beals Dist VA    Right -1.00 +0.75 140 20/20    Left -1.00 +0.75 120 20/20-      Type: Distance only                     ASSESSMENT/PLAN:     Diagnoses and Plan:     Prediabetes  Diet controlled diabetes: no background of retinopathy, no signs of neovascularization noted.  Discussed ocular and systemic benefits of blood sugar control.  Diagnosis and treatment discussed in detail with patient.    Will see patient in 2 years for a diabetic exam    Age-related nuclear cataract of both eyes  Discussed early cataracts with patient.  No treatment recommended at this time.     New glasses Rx given today for distance only glasses, update as needed    Vitreous floaters of left eye   There is no evidence of retinal pathology.  All signs and symptoms of retinal detachment/tears explained in detail.    Patient instructed to call the office if they experience increase in floaters, increase in flashes of light, loss of vision or curtain or veil effect.       No orders of the defined types were placed in this encounter.      Meds This Visit:  Requested Prescriptions      No prescriptions requested or ordered in this encounter        Follow up instructions:  Return in about 1 year (around 1/24/2025) for Diabetic eye exam.    1/24/2024  Scribed by: Ignacio Allen MD        If you have questions, please do not hesitate to call me. I look  forward to following Fiordaliza along with you.    Sincerely,        Ignacio Allen MD        CC:   No Recipients    Document electronically generated by: Ignacio Allen MD

## (undated) NOTE — LETTER
12/13/2018          To Whom It May Concern:    Fiordaliza Leblanc was seen in the office today and may return back to work on 12/14/18. If you require additional information please contact our office.         Sincerely,    Patrick Mcnamara MD

## (undated) NOTE — LETTER
March 6, 2018     [address] [place] [address] [address] [facility] [address]      Dear Kiara Breach:    Below are the results from your recent visit:    Your mammogram breast is normal /stable. I recommend routine follow up  mammogram in 1 year.  Please  continue mo CANCER. A CLINICALLY SUSPICIOUS PALPABLE LUMP SHOULD BE BIOPSIED. For patients over the age of 36, the target due date for the patient's next mammogram has been entered into a reminder system.        Patient received a discharge summary from the tech

## (undated) NOTE — LETTER
Date & Time: 10/2/2020, 5:29 PM  Patient: Bennie Dutta  Encounter Provider(s):    MARTA Ledezma       To Whom It May Concern:    Joanne Leger was seen and treated in our department on 10/2/2020. She should not return to work until 10/13/2020.  So

## (undated) NOTE — ED AVS SNAPSHOT
Ms. Nayana Barr   MRN: [id number]    Department:  [facility] Emergency Department   Date of Visit:  3/2/2019           Disclosure     Insurance plans vary and the physician(s) referred by the ER may not be covered by your plan.  Please contact CARE PHYSICIAN AT ONCE OR RETURN IMMEDIATELY TO THE EMERGENCY DEPARTMENT. If you have been prescribed any medication(s), please fill your prescription right away and begin taking the medication(s) as directed.   If you believe that any of the medications

## (undated) NOTE — LETTER
4/25/2019          To Whom It May Concern:    Fiordaliza Leblanc was seen in the office on 04/23/19 and she will return back to work on 04/29/19If you require additional information please contact our office.         Sincerely,    Rick Worrell MD

## (undated) NOTE — LETTER
7/3/2019              Fiordaliza Leblanc        [id number]        [facility] [address]         Dear [facility],    This letter is to inform you that our office has made several attempts to reach you by phone without success.   We were attempting to contact y

## (undated) NOTE — LETTER
May 1, 2019     Fiordaliza Leblanc  [address]  [facility] [address]      Dear Raleigh Fernandez:    Below are the results from your recent visit:    Your kidney and liver function are normal, your blood count shows no anemia.  Your cholesterol is mildly elevated Glucose Urine Negative Negative mg/dL    Ketones Urine Negative Negative mg/dL    Bilirubin Urine Negative Negative    Blood Urine Small (A) Negative    Nitrite Urine Negative Negative    Urobilinogen Urine <2.0 <2.0    Leukocyte Esterase Urine Moderate (

## (undated) NOTE — LETTER
Elana Cottrell Md  [address] [place] [address], [address] [place] [address]       03/06/18        Patient: Antonella Bazzi   YOB: 1960   Date of Visit: 3/6/2018       Dear  Dr. Mike Browne MD,      Thank you for referring marcos Bazzi to my practice.   Please

## (undated) NOTE — LETTER
Date & Time: 3/2/2019, 1:22 PM  Patient: Shefali Lucio  Encounter Provider(s):    MARTA Anderson       To Whom It May Concern:    Sharon Anne was seen and treated in our department on 3/2/2019. She should not return to work until 3/6/2019.     If

## (undated) NOTE — LETTER
3/6/2019          To Whom It May Concern:    Fiordaliza Leblanc is currently under my medical care and may not return to work at this time. Please excuse Fiordaliza for one day. She may return to work on 3/9/10.   Activity is restricted as follows:none  If you re

## (undated) NOTE — LETTER
08/01/19        Fiordaliza Leblanc  [id number] [address]      Dear Billy Hua records indicate that you have outstanding lab work and or testing that was ordered for you and has not yet been completed:  Orders Placed This Encounter